# Patient Record
Sex: FEMALE | Race: ASIAN | NOT HISPANIC OR LATINO | Employment: FULL TIME | URBAN - METROPOLITAN AREA
[De-identification: names, ages, dates, MRNs, and addresses within clinical notes are randomized per-mention and may not be internally consistent; named-entity substitution may affect disease eponyms.]

---

## 2017-01-12 ENCOUNTER — COMMUNICATION - HEALTHEAST (OUTPATIENT)
Dept: FAMILY MEDICINE | Facility: CLINIC | Age: 27
End: 2017-01-12

## 2017-01-23 ENCOUNTER — PRENATAL OFFICE VISIT - HEALTHEAST (OUTPATIENT)
Dept: FAMILY MEDICINE | Facility: CLINIC | Age: 27
End: 2017-01-23

## 2017-01-23 DIAGNOSIS — Z34.92 PRENATAL CARE IN SECOND TRIMESTER: ICD-10-CM

## 2017-01-23 ASSESSMENT — MIFFLIN-ST. JEOR: SCORE: 1660.29

## 2017-02-16 ENCOUNTER — HOSPITAL ENCOUNTER (OUTPATIENT)
Dept: ULTRASOUND IMAGING | Facility: HOSPITAL | Age: 27
Discharge: HOME | End: 2017-02-16
Attending: FAMILY MEDICINE

## 2017-02-20 ENCOUNTER — PRENATAL OFFICE VISIT - HEALTHEAST (OUTPATIENT)
Dept: FAMILY MEDICINE | Facility: CLINIC | Age: 27
End: 2017-02-20

## 2017-02-20 DIAGNOSIS — E66.9 OBESITY: ICD-10-CM

## 2017-02-20 DIAGNOSIS — Z34.92 PRENATAL CARE IN SECOND TRIMESTER: ICD-10-CM

## 2017-02-20 ASSESSMENT — MIFFLIN-ST. JEOR: SCORE: 1689.78

## 2017-03-20 ENCOUNTER — PRENATAL OFFICE VISIT - HEALTHEAST (OUTPATIENT)
Dept: FAMILY MEDICINE | Facility: CLINIC | Age: 27
End: 2017-03-20

## 2017-03-20 DIAGNOSIS — E66.9 OBESITY: ICD-10-CM

## 2017-03-20 DIAGNOSIS — Z34.92 PRENATAL CARE IN SECOND TRIMESTER: ICD-10-CM

## 2017-03-20 ASSESSMENT — MIFFLIN-ST. JEOR: SCORE: 1721.53

## 2017-04-17 ENCOUNTER — PRENATAL OFFICE VISIT - HEALTHEAST (OUTPATIENT)
Dept: FAMILY MEDICINE | Facility: CLINIC | Age: 27
End: 2017-04-17

## 2017-04-17 ENCOUNTER — COMMUNICATION - HEALTHEAST (OUTPATIENT)
Dept: FAMILY MEDICINE | Facility: CLINIC | Age: 27
End: 2017-04-17

## 2017-04-17 DIAGNOSIS — Z34.92 PRENATAL CARE IN SECOND TRIMESTER: ICD-10-CM

## 2017-04-17 ASSESSMENT — MIFFLIN-ST. JEOR: SCORE: 1755.55

## 2017-04-18 ENCOUNTER — COMMUNICATION - HEALTHEAST (OUTPATIENT)
Dept: FAMILY MEDICINE | Facility: CLINIC | Age: 27
End: 2017-04-18

## 2017-04-18 LAB — SYPHILIS RPR SCREEN - HISTORICAL: NORMAL

## 2017-05-01 ENCOUNTER — PRENATAL OFFICE VISIT - HEALTHEAST (OUTPATIENT)
Dept: FAMILY MEDICINE | Facility: CLINIC | Age: 27
End: 2017-05-01

## 2017-05-01 DIAGNOSIS — Z34.90 PREGNANCY: ICD-10-CM

## 2017-05-01 DIAGNOSIS — E66.9 OBESITY: ICD-10-CM

## 2017-05-01 ASSESSMENT — MIFFLIN-ST. JEOR: SCORE: 1769.15

## 2017-05-30 ENCOUNTER — PRENATAL OFFICE VISIT - HEALTHEAST (OUTPATIENT)
Dept: FAMILY MEDICINE | Facility: CLINIC | Age: 27
End: 2017-05-30

## 2017-05-30 DIAGNOSIS — Z34.90 PREGNANCY: ICD-10-CM

## 2017-05-30 ASSESSMENT — MIFFLIN-ST. JEOR: SCORE: 1790.7

## 2017-06-06 ENCOUNTER — PRENATAL OFFICE VISIT - HEALTHEAST (OUTPATIENT)
Dept: FAMILY MEDICINE | Facility: CLINIC | Age: 27
End: 2017-06-06

## 2017-06-06 DIAGNOSIS — Z34.90 PREGNANCY: ICD-10-CM

## 2017-06-06 ASSESSMENT — MIFFLIN-ST. JEOR: SCORE: 1807.71

## 2017-06-15 ENCOUNTER — PRENATAL OFFICE VISIT - HEALTHEAST (OUTPATIENT)
Dept: FAMILY MEDICINE | Facility: CLINIC | Age: 27
End: 2017-06-15

## 2017-06-15 DIAGNOSIS — E66.9 OBESITY: ICD-10-CM

## 2017-06-15 DIAGNOSIS — Z34.90 PREGNANCY: ICD-10-CM

## 2017-06-15 ASSESSMENT — MIFFLIN-ST. JEOR: SCORE: 1791.83

## 2017-06-27 ENCOUNTER — PRENATAL OFFICE VISIT - HEALTHEAST (OUTPATIENT)
Dept: FAMILY MEDICINE | Facility: CLINIC | Age: 27
End: 2017-06-27

## 2017-06-27 ENCOUNTER — HOSPITAL ENCOUNTER (OUTPATIENT)
Dept: ULTRASOUND IMAGING | Facility: HOSPITAL | Age: 27
Discharge: HOME | End: 2017-06-27
Attending: FAMILY MEDICINE

## 2017-06-27 DIAGNOSIS — E66.9 OBESITY: ICD-10-CM

## 2017-06-27 DIAGNOSIS — Z33.1 PREGNANT STATE, INCIDENTAL: ICD-10-CM

## 2017-06-27 DIAGNOSIS — Z34.90 PREGNANCY: ICD-10-CM

## 2017-06-27 ASSESSMENT — MIFFLIN-ST. JEOR: SCORE: 1825.85

## 2017-07-06 ENCOUNTER — AMBULATORY - HEALTHEAST (OUTPATIENT)
Dept: FAMILY MEDICINE | Facility: CLINIC | Age: 27
End: 2017-07-06

## 2017-07-06 ENCOUNTER — COMMUNICATION - HEALTHEAST (OUTPATIENT)
Dept: FAMILY MEDICINE | Facility: CLINIC | Age: 27
End: 2017-07-06

## 2017-07-06 DIAGNOSIS — R10.2 POSTPARTUM PERINEAL PAIN: ICD-10-CM

## 2017-07-27 ENCOUNTER — COMMUNICATION - HEALTHEAST (OUTPATIENT)
Dept: FAMILY MEDICINE | Facility: CLINIC | Age: 27
End: 2017-07-27

## 2017-08-08 ENCOUNTER — OFFICE VISIT - HEALTHEAST (OUTPATIENT)
Dept: FAMILY MEDICINE | Facility: CLINIC | Age: 27
End: 2017-08-08

## 2017-08-08 DIAGNOSIS — D64.9 ANEMIA: ICD-10-CM

## 2017-08-08 ASSESSMENT — MIFFLIN-ST. JEOR: SCORE: 1696.58

## 2017-08-10 ENCOUNTER — COMMUNICATION - HEALTHEAST (OUTPATIENT)
Dept: FAMILY MEDICINE | Facility: CLINIC | Age: 27
End: 2017-08-10

## 2018-08-21 ENCOUNTER — OFFICE VISIT - HEALTHEAST (OUTPATIENT)
Dept: FAMILY MEDICINE | Facility: CLINIC | Age: 28
End: 2018-08-21

## 2018-08-21 DIAGNOSIS — D64.9 ANEMIA: ICD-10-CM

## 2018-08-21 DIAGNOSIS — N92.6 IRREGULAR MENSES: ICD-10-CM

## 2018-08-21 DIAGNOSIS — E66.9 OBESE: ICD-10-CM

## 2018-08-21 DIAGNOSIS — Z23 NEED FOR HPV VACCINATION: ICD-10-CM

## 2018-08-21 LAB
HCG UR QL: NEGATIVE
HGB BLD-MCNC: 13.4 G/DL (ref 12–16)
SP GR UR STRIP: 1.02 (ref 1–1.03)

## 2018-08-21 ASSESSMENT — MIFFLIN-ST. JEOR: SCORE: 1748.29

## 2018-09-14 ENCOUNTER — COMMUNICATION - HEALTHEAST (OUTPATIENT)
Dept: FAMILY MEDICINE | Facility: CLINIC | Age: 28
End: 2018-09-14

## 2018-10-12 ENCOUNTER — OFFICE VISIT - HEALTHEAST (OUTPATIENT)
Dept: FAMILY MEDICINE | Facility: CLINIC | Age: 28
End: 2018-10-12

## 2018-10-12 DIAGNOSIS — Z23 NEED FOR IMMUNIZATION AGAINST INFLUENZA: ICD-10-CM

## 2018-10-12 DIAGNOSIS — Z34.90 PREGNANCY: ICD-10-CM

## 2018-10-12 DIAGNOSIS — R63.5 WEIGHT GAIN: ICD-10-CM

## 2018-10-12 DIAGNOSIS — N92.6 IRREGULAR MENSES: ICD-10-CM

## 2018-10-12 LAB
ALBUMIN SERPL-MCNC: 4.1 G/DL (ref 3.5–5)
ALP SERPL-CCNC: 65 U/L (ref 45–120)
ALT SERPL W P-5'-P-CCNC: 59 U/L (ref 0–45)
ANION GAP SERPL CALCULATED.3IONS-SCNC: 10 MMOL/L (ref 5–18)
AST SERPL W P-5'-P-CCNC: 30 U/L (ref 0–40)
BASOPHILS # BLD AUTO: 0 THOU/UL (ref 0–0.2)
BASOPHILS NFR BLD AUTO: 1 % (ref 0–2)
BILIRUB SERPL-MCNC: 0.9 MG/DL (ref 0–1)
BUN SERPL-MCNC: 16 MG/DL (ref 8–22)
CALCIUM SERPL-MCNC: 9.6 MG/DL (ref 8.5–10.5)
CHLORIDE BLD-SCNC: 104 MMOL/L (ref 98–107)
CO2 SERPL-SCNC: 26 MMOL/L (ref 22–31)
CREAT SERPL-MCNC: 0.71 MG/DL (ref 0.6–1.1)
EOSINOPHIL # BLD AUTO: 0.2 THOU/UL (ref 0–0.4)
EOSINOPHIL NFR BLD AUTO: 3 % (ref 0–6)
ERYTHROCYTE [DISTWIDTH] IN BLOOD BY AUTOMATED COUNT: 11.6 % (ref 11–14.5)
FSH SERPL-ACNC: <3 MIU/ML
GFR SERPL CREATININE-BSD FRML MDRD: >60 ML/MIN/1.73M2
GLUCOSE BLD-MCNC: 101 MG/DL (ref 70–125)
HBA1C MFR BLD: 5.3 % (ref 3.5–6)
HCG UR QL: NEGATIVE
HCT VFR BLD AUTO: 40.2 % (ref 35–47)
HGB BLD-MCNC: 13.2 G/DL (ref 12–16)
LH SERPL-ACNC: 6.9 MIU/ML
LYMPHOCYTES # BLD AUTO: 2.4 THOU/UL (ref 0.8–4.4)
LYMPHOCYTES NFR BLD AUTO: 29 % (ref 20–40)
MCH RBC QN AUTO: 30.5 PG (ref 27–34)
MCHC RBC AUTO-ENTMCNC: 32.9 G/DL (ref 32–36)
MCV RBC AUTO: 93 FL (ref 80–100)
MONOCYTES # BLD AUTO: 0.6 THOU/UL (ref 0–0.9)
MONOCYTES NFR BLD AUTO: 7 % (ref 2–10)
NEUTROPHILS # BLD AUTO: 5 THOU/UL (ref 2–7.7)
NEUTROPHILS NFR BLD AUTO: 61 % (ref 50–70)
PLATELET # BLD AUTO: 239 THOU/UL (ref 140–440)
PMV BLD AUTO: 7.8 FL (ref 7–10)
POTASSIUM BLD-SCNC: 3.8 MMOL/L (ref 3.5–5)
PROLACTIN SERPL-MCNC: 11.7 NG/ML (ref 0–20)
PROT SERPL-MCNC: 7.7 G/DL (ref 6–8)
RBC # BLD AUTO: 4.32 MILL/UL (ref 3.8–5.4)
SODIUM SERPL-SCNC: 140 MMOL/L (ref 136–145)
SP GR UR STRIP: 1.02 (ref 1–1.03)
TSH SERPL DL<=0.005 MIU/L-ACNC: 2.51 UIU/ML (ref 0.3–5)
TSH SERPL DL<=0.005 MIU/L-ACNC: 2.72 UIU/ML (ref 0.3–5)
WBC: 8.3 THOU/UL (ref 4–11)

## 2018-10-12 ASSESSMENT — MIFFLIN-ST. JEOR: SCORE: 1746.48

## 2018-10-14 LAB — DHEA-S SERPL-MCNC: 120 UG/DL (ref 65–380)

## 2018-10-16 LAB
17OHP SERPL-MCNC: 27 NG/DL
ANDROST SERPL-MCNC: 0.49 NG/ML (ref 0.26–2.14)
SHBG SERPL-SCNC: 14 NMOL/L (ref 30–135)
TESTOST FREE SERPL-MCNC: 0.56 NG/DL (ref 0.08–0.74)
TESTOST SERPL-MCNC: 21 NG/DL (ref 8–60)

## 2018-10-17 ENCOUNTER — HOSPITAL ENCOUNTER (OUTPATIENT)
Dept: ULTRASOUND IMAGING | Facility: HOSPITAL | Age: 28
Discharge: HOME | End: 2018-10-17
Attending: FAMILY MEDICINE

## 2018-10-17 DIAGNOSIS — R63.5 WEIGHT GAIN: ICD-10-CM

## 2018-10-17 DIAGNOSIS — N92.6 IRREGULAR MENSES: ICD-10-CM

## 2018-11-03 ENCOUNTER — COMMUNICATION - HEALTHEAST (OUTPATIENT)
Dept: FAMILY MEDICINE | Facility: CLINIC | Age: 28
End: 2018-11-03

## 2018-11-08 ENCOUNTER — COMMUNICATION - HEALTHEAST (OUTPATIENT)
Dept: FAMILY MEDICINE | Facility: CLINIC | Age: 28
End: 2018-11-08

## 2018-11-08 DIAGNOSIS — N83.299 COMPLEX OVARIAN CYST: ICD-10-CM

## 2018-11-13 ENCOUNTER — COMMUNICATION - HEALTHEAST (OUTPATIENT)
Dept: FAMILY MEDICINE | Facility: CLINIC | Age: 28
End: 2018-11-13

## 2018-11-14 ENCOUNTER — AMBULATORY - HEALTHEAST (OUTPATIENT)
Dept: FAMILY MEDICINE | Facility: CLINIC | Age: 28
End: 2018-11-14

## 2018-11-14 DIAGNOSIS — N92.6 IRREGULAR MENSES: ICD-10-CM

## 2018-11-15 ENCOUNTER — COMMUNICATION - HEALTHEAST (OUTPATIENT)
Dept: ADMINISTRATIVE | Facility: CLINIC | Age: 28
End: 2018-11-15

## 2018-11-20 ENCOUNTER — COMMUNICATION - HEALTHEAST (OUTPATIENT)
Dept: FAMILY MEDICINE | Facility: CLINIC | Age: 28
End: 2018-11-20

## 2018-11-20 ENCOUNTER — HOSPITAL ENCOUNTER (OUTPATIENT)
Dept: ULTRASOUND IMAGING | Facility: HOSPITAL | Age: 28
Discharge: HOME | End: 2018-11-20
Attending: FAMILY MEDICINE

## 2018-11-20 DIAGNOSIS — N83.299 COMPLEX OVARIAN CYST: ICD-10-CM

## 2018-11-26 ENCOUNTER — COMMUNICATION - HEALTHEAST (OUTPATIENT)
Dept: FAMILY MEDICINE | Facility: CLINIC | Age: 28
End: 2018-11-26

## 2019-01-23 ENCOUNTER — COMMUNICATION - HEALTHEAST (OUTPATIENT)
Dept: HEALTH INFORMATION MANAGEMENT | Facility: CLINIC | Age: 29
End: 2019-01-23

## 2019-01-23 ENCOUNTER — COMMUNICATION - HEALTHEAST (OUTPATIENT)
Dept: TELEHEALTH | Facility: CLINIC | Age: 29
End: 2019-01-23

## 2019-02-23 ENCOUNTER — RECORDS - HEALTHEAST (OUTPATIENT)
Dept: ADMINISTRATIVE | Facility: OTHER | Age: 29
End: 2019-02-23

## 2019-02-23 ENCOUNTER — SURGERY - HEALTHEAST (OUTPATIENT)
Dept: SURGERY | Facility: HOSPITAL | Age: 29
End: 2019-02-23

## 2019-02-23 ENCOUNTER — ANESTHESIA - HEALTHEAST (OUTPATIENT)
Dept: SURGERY | Facility: HOSPITAL | Age: 29
End: 2019-02-23

## 2019-02-23 ASSESSMENT — MIFFLIN-ST. JEOR
SCORE: 1714.72
SCORE: 1746.48

## 2019-03-01 ENCOUNTER — RECORDS - HEALTHEAST (OUTPATIENT)
Dept: ADMINISTRATIVE | Facility: OTHER | Age: 29
End: 2019-03-01

## 2019-03-11 ENCOUNTER — AMBULATORY - HEALTHEAST (OUTPATIENT)
Dept: FAMILY MEDICINE | Facility: CLINIC | Age: 29
End: 2019-03-11

## 2019-03-11 DIAGNOSIS — E28.2 PCOS (POLYCYSTIC OVARIAN SYNDROME): ICD-10-CM

## 2019-03-15 ENCOUNTER — COMMUNICATION - HEALTHEAST (OUTPATIENT)
Dept: FAMILY MEDICINE | Facility: CLINIC | Age: 29
End: 2019-03-15

## 2019-04-15 ENCOUNTER — RECORDS - HEALTHEAST (OUTPATIENT)
Dept: ADMINISTRATIVE | Facility: OTHER | Age: 29
End: 2019-04-15

## 2019-04-16 ENCOUNTER — RECORDS - HEALTHEAST (OUTPATIENT)
Dept: ADMINISTRATIVE | Facility: OTHER | Age: 29
End: 2019-04-16

## 2019-04-23 ENCOUNTER — COMMUNICATION - HEALTHEAST (OUTPATIENT)
Dept: FAMILY MEDICINE | Facility: CLINIC | Age: 29
End: 2019-04-23

## 2019-04-23 ENCOUNTER — OFFICE VISIT - HEALTHEAST (OUTPATIENT)
Dept: FAMILY MEDICINE | Facility: CLINIC | Age: 29
End: 2019-04-23

## 2019-04-23 DIAGNOSIS — Z3A.01 LESS THAN 8 WEEKS GESTATION OF PREGNANCY: ICD-10-CM

## 2019-04-23 DIAGNOSIS — E66.01 MORBID OBESITY (H): ICD-10-CM

## 2019-04-23 DIAGNOSIS — N92.6 MISSED MENSES: ICD-10-CM

## 2019-04-23 DIAGNOSIS — D39.10 GRANULOSA CELL TUMOR: ICD-10-CM

## 2019-04-23 LAB — HCG UR QL: POSITIVE

## 2019-04-23 ASSESSMENT — MIFFLIN-ST. JEOR: SCORE: 1720.24

## 2019-04-26 ENCOUNTER — RECORDS - HEALTHEAST (OUTPATIENT)
Dept: ADMINISTRATIVE | Facility: OTHER | Age: 29
End: 2019-04-26

## 2019-05-06 ENCOUNTER — HOSPITAL ENCOUNTER (OUTPATIENT)
Dept: MRI IMAGING | Facility: CLINIC | Age: 29
Discharge: HOME | End: 2019-05-06
Attending: OBSTETRICS & GYNECOLOGY

## 2019-05-06 DIAGNOSIS — D39.10 GRANULOSA CELL TUMOR OF OVARY, UNSPECIFIED LATERALITY: ICD-10-CM

## 2019-05-10 ENCOUNTER — COMMUNICATION - HEALTHEAST (OUTPATIENT)
Dept: FAMILY MEDICINE | Facility: CLINIC | Age: 29
End: 2019-05-10

## 2019-05-10 ENCOUNTER — RECORDS - HEALTHEAST (OUTPATIENT)
Dept: ADMINISTRATIVE | Facility: OTHER | Age: 29
End: 2019-05-10

## 2019-05-10 DIAGNOSIS — Z3A.01 LESS THAN 8 WEEKS GESTATION OF PREGNANCY: ICD-10-CM

## 2019-05-14 ENCOUNTER — COMMUNICATION - HEALTHEAST (OUTPATIENT)
Dept: ADMINISTRATIVE | Facility: CLINIC | Age: 29
End: 2019-05-14

## 2019-05-14 ENCOUNTER — HOSPITAL ENCOUNTER (OUTPATIENT)
Dept: ULTRASOUND IMAGING | Facility: HOSPITAL | Age: 29
Discharge: HOME | End: 2019-05-14
Attending: FAMILY MEDICINE

## 2019-05-14 DIAGNOSIS — Z3A.01 LESS THAN 8 WEEKS GESTATION OF PREGNANCY: ICD-10-CM

## 2019-06-07 ENCOUNTER — COMMUNICATION - HEALTHEAST (OUTPATIENT)
Dept: FAMILY MEDICINE | Facility: CLINIC | Age: 29
End: 2019-06-07

## 2019-07-05 ENCOUNTER — PRENATAL OFFICE VISIT - HEALTHEAST (OUTPATIENT)
Dept: FAMILY MEDICINE | Facility: CLINIC | Age: 29
End: 2019-07-05

## 2019-07-05 DIAGNOSIS — O09.92 HIGH-RISK PREGNANCY IN SECOND TRIMESTER: ICD-10-CM

## 2019-07-05 DIAGNOSIS — D39.10 GRANULOSA CELL TUMOR: ICD-10-CM

## 2019-07-05 DIAGNOSIS — Z12.4 SCREENING FOR CERVICAL CANCER: ICD-10-CM

## 2019-07-05 DIAGNOSIS — E66.01 MORBID OBESITY (H): ICD-10-CM

## 2019-07-05 LAB
ALBUMIN UR-MCNC: NEGATIVE MG/DL
AMPHETAMINES UR QL SCN: NORMAL
APPEARANCE UR: CLEAR
BARBITURATES UR QL: NORMAL
BASOPHILS # BLD AUTO: 0 THOU/UL (ref 0–0.2)
BASOPHILS NFR BLD AUTO: 1 % (ref 0–2)
BENZODIAZ UR QL: NORMAL
BILIRUB UR QL STRIP: NEGATIVE
CANNABINOIDS UR QL SCN: NORMAL
CLUE CELLS: NORMAL
COCAINE UR QL: NORMAL
COLOR UR AUTO: YELLOW
CREAT UR-MCNC: 150.4 MG/DL
EOSINOPHIL # BLD AUTO: 0.1 THOU/UL (ref 0–0.4)
EOSINOPHIL NFR BLD AUTO: 1 % (ref 0–6)
ERYTHROCYTE [DISTWIDTH] IN BLOOD BY AUTOMATED COUNT: 15.3 % (ref 11–14.5)
FASTING STATUS PATIENT QL REPORTED: YES
GLUCOSE BLD-MCNC: 62 MG/DL (ref 70–99)
GLUCOSE UR STRIP-MCNC: NEGATIVE MG/DL
HBA1C MFR BLD: 5.3 % (ref 3.5–6)
HCT VFR BLD AUTO: 38 % (ref 35–47)
HGB BLD-MCNC: 12.7 G/DL (ref 12–16)
HGB UR QL STRIP: ABNORMAL
HIV 1+2 AB+HIV1 P24 AG SERPL QL IA: NEGATIVE
KETONES UR STRIP-MCNC: NEGATIVE MG/DL
LEUKOCYTE ESTERASE UR QL STRIP: ABNORMAL
LYMPHOCYTES # BLD AUTO: 1.7 THOU/UL (ref 0.8–4.4)
LYMPHOCYTES NFR BLD AUTO: 20 % (ref 20–40)
MCH RBC QN AUTO: 30.1 PG (ref 27–34)
MCHC RBC AUTO-ENTMCNC: 33.4 G/DL (ref 32–36)
MCV RBC AUTO: 90 FL (ref 80–100)
MONOCYTES # BLD AUTO: 0.5 THOU/UL (ref 0–0.9)
MONOCYTES NFR BLD AUTO: 5 % (ref 2–10)
NEUTROPHILS # BLD AUTO: 6.3 THOU/UL (ref 2–7.7)
NEUTROPHILS NFR BLD AUTO: 73 % (ref 50–70)
NITRATE UR QL: NEGATIVE
OPIATES UR QL SCN: NORMAL
OXYCODONE UR QL: NORMAL
PCP UR QL SCN: NORMAL
PH UR STRIP: 5.5 [PH] (ref 5–8)
PLATELET # BLD AUTO: 208 THOU/UL (ref 140–440)
PMV BLD AUTO: 11.2 FL (ref 8.5–12.5)
RBC # BLD AUTO: 4.22 MILL/UL (ref 3.8–5.4)
SP GR UR STRIP: 1.02 (ref 1–1.03)
TRICHOMONAS, WET PREP: NORMAL
UROBILINOGEN UR STRIP-ACNC: ABNORMAL
WBC: 8.7 THOU/UL (ref 4–11)
YEAST, WET PREP: NORMAL

## 2019-07-05 ASSESSMENT — MIFFLIN-ST. JEOR: SCORE: 1737.4

## 2019-07-06 LAB
ANTIBODY SCREEN: NEGATIVE
BACTERIA SPEC CULT: NO GROWTH
COLLECTION METHOD: NORMAL
HBV SURFACE AG SERPL QL IA: NEGATIVE
LEAD BLD-MCNC: NORMAL UG/DL
LEAD RETEST: NO

## 2019-07-07 LAB
LEAD BLDV-MCNC: <2 UG/DL (ref 0–4.9)
T PALLIDUM AB SER QL: NEGATIVE

## 2019-07-08 LAB
ABO/RH(D): NORMAL
ABORH REPEAT: NORMAL
C TRACH DNA SPEC QL PROBE+SIG AMP: NEGATIVE
N GONORRHOEA DNA SPEC QL NAA+PROBE: NEGATIVE
RUBV IGG SERPL QL IA: NORMAL

## 2019-07-09 ENCOUNTER — COMMUNICATION - HEALTHEAST (OUTPATIENT)
Dept: FAMILY MEDICINE | Facility: CLINIC | Age: 29
End: 2019-07-09

## 2019-07-09 LAB
BKR LAB AP ABNORMAL BLEEDING: NO
BKR LAB AP BIRTH CONTROL/HORMONES: NORMAL
BKR LAB AP CERVICAL APPEARANCE: NORMAL
BKR LAB AP GYN ADEQUACY: NORMAL
BKR LAB AP GYN INTERPRETATION: NORMAL
BKR LAB AP HPV REFLEX: NORMAL
BKR LAB AP LMP: NORMAL
BKR LAB AP PATIENT STATUS: NORMAL
BKR LAB AP PREVIOUS ABNORMAL: NORMAL
BKR LAB AP PREVIOUS NORMAL: 2016
HIGH RISK?: NO
PATH REPORT.COMMENTS IMP SPEC: NORMAL
RESULT FLAG (HE HISTORICAL CONVERSION): NORMAL

## 2019-07-29 ENCOUNTER — COMMUNICATION - HEALTHEAST (OUTPATIENT)
Dept: FAMILY MEDICINE | Facility: CLINIC | Age: 29
End: 2019-07-29

## 2019-08-05 ENCOUNTER — RECORDS - HEALTHEAST (OUTPATIENT)
Dept: ADMINISTRATIVE | Facility: OTHER | Age: 29
End: 2019-08-05

## 2019-08-12 ENCOUNTER — HOSPITAL ENCOUNTER (OUTPATIENT)
Dept: MRI IMAGING | Facility: HOSPITAL | Age: 29
Discharge: HOME | End: 2019-08-12
Attending: OBSTETRICS & GYNECOLOGY

## 2019-08-12 DIAGNOSIS — Z34.90 INTRAUTERINE PREGNANCY: ICD-10-CM

## 2019-08-12 DIAGNOSIS — D39.10 GRANULOSA CELL TUMOR OF OVARY, UNSPECIFIED LATERALITY: ICD-10-CM

## 2019-08-15 ENCOUNTER — RECORDS - HEALTHEAST (OUTPATIENT)
Dept: ADMINISTRATIVE | Facility: OTHER | Age: 29
End: 2019-08-15

## 2019-09-01 ENCOUNTER — COMMUNICATION - HEALTHEAST (OUTPATIENT)
Dept: FAMILY MEDICINE | Facility: CLINIC | Age: 29
End: 2019-09-01

## 2019-09-03 ENCOUNTER — AMBULATORY - HEALTHEAST (OUTPATIENT)
Dept: FAMILY MEDICINE | Facility: CLINIC | Age: 29
End: 2019-09-03

## 2019-09-03 DIAGNOSIS — J30.89 SEASONAL ALLERGIC RHINITIS DUE TO OTHER ALLERGIC TRIGGER: ICD-10-CM

## 2019-09-06 ENCOUNTER — PRENATAL OFFICE VISIT - HEALTHEAST (OUTPATIENT)
Dept: FAMILY MEDICINE | Facility: CLINIC | Age: 29
End: 2019-09-06

## 2019-09-06 DIAGNOSIS — Z3A.24 24 WEEKS GESTATION OF PREGNANCY: ICD-10-CM

## 2019-09-06 DIAGNOSIS — J30.89 SEASONAL ALLERGIC RHINITIS DUE TO OTHER ALLERGIC TRIGGER: ICD-10-CM

## 2019-09-06 DIAGNOSIS — Z23 NEED FOR IMMUNIZATION AGAINST INFLUENZA: ICD-10-CM

## 2019-09-06 LAB
ALBUMIN UR-MCNC: ABNORMAL MG/DL
APPEARANCE UR: ABNORMAL
BACTERIA #/AREA URNS HPF: ABNORMAL HPF
BILIRUB UR QL STRIP: NEGATIVE
COLOR UR AUTO: YELLOW
FASTING STATUS PATIENT QL REPORTED: NO
GLUCOSE 1H P 50 G GLC PO SERPL-MCNC: 105 MG/DL (ref 70–139)
GLUCOSE UR STRIP-MCNC: NEGATIVE MG/DL
HGB BLD-MCNC: 11.5 G/DL (ref 12–16)
HGB UR QL STRIP: NEGATIVE
KETONES UR STRIP-MCNC: NEGATIVE MG/DL
LEUKOCYTE ESTERASE UR QL STRIP: ABNORMAL
NITRATE UR QL: NEGATIVE
PH UR STRIP: 6 [PH] (ref 5–8)
RBC #/AREA URNS AUTO: ABNORMAL HPF
SP GR UR STRIP: >=1.03 (ref 1–1.03)
SQUAMOUS #/AREA URNS AUTO: ABNORMAL LPF
UROBILINOGEN UR STRIP-ACNC: ABNORMAL
WBC #/AREA URNS AUTO: ABNORMAL HPF
WBC CLUMPS #/AREA URNS HPF: PRESENT /[HPF]

## 2019-09-06 ASSESSMENT — MIFFLIN-ST. JEOR: SCORE: 1773.69

## 2019-09-07 LAB
BACTERIA SPEC CULT: NO GROWTH
T PALLIDUM AB SER QL: NEGATIVE

## 2019-09-08 ENCOUNTER — COMMUNICATION - HEALTHEAST (OUTPATIENT)
Dept: FAMILY MEDICINE | Facility: CLINIC | Age: 29
End: 2019-09-08

## 2019-09-23 ENCOUNTER — RECORDS - HEALTHEAST (OUTPATIENT)
Dept: ADMINISTRATIVE | Facility: OTHER | Age: 29
End: 2019-09-23

## 2019-10-01 ENCOUNTER — COMMUNICATION - HEALTHEAST (OUTPATIENT)
Dept: FAMILY MEDICINE | Facility: CLINIC | Age: 29
End: 2019-10-01

## 2019-10-04 ENCOUNTER — PRENATAL OFFICE VISIT - HEALTHEAST (OUTPATIENT)
Dept: FAMILY MEDICINE | Facility: CLINIC | Age: 29
End: 2019-10-04

## 2019-10-04 DIAGNOSIS — I49.9 CARDIAC ARRHYTHMIA, UNSPECIFIED CARDIAC ARRHYTHMIA TYPE: ICD-10-CM

## 2019-10-04 DIAGNOSIS — K21.9 GASTROESOPHAGEAL REFLUX DISEASE WITHOUT ESOPHAGITIS: ICD-10-CM

## 2019-10-04 DIAGNOSIS — Z3A.28 28 WEEKS GESTATION OF PREGNANCY: ICD-10-CM

## 2019-10-04 DIAGNOSIS — R25.2 CRAMP OF LIMB: ICD-10-CM

## 2019-10-04 LAB
ATRIAL RATE - MUSE: 84 BPM
DIASTOLIC BLOOD PRESSURE - MUSE: NORMAL
INTERPRETATION ECG - MUSE: NORMAL
P AXIS - MUSE: 36 DEGREES
PR INTERVAL - MUSE: 136 MS
QRS DURATION - MUSE: 84 MS
QT - MUSE: 370 MS
QTC - MUSE: 437 MS
R AXIS - MUSE: 23 DEGREES
SYSTOLIC BLOOD PRESSURE - MUSE: NORMAL
T AXIS - MUSE: 27 DEGREES
TSH SERPL DL<=0.005 MIU/L-ACNC: 1.22 UIU/ML (ref 0.3–5)
VENTRICULAR RATE- MUSE: 84 BPM

## 2019-10-04 ASSESSMENT — MIFFLIN-ST. JEOR: SCORE: 1789.57

## 2019-10-08 ENCOUNTER — COMMUNICATION - HEALTHEAST (OUTPATIENT)
Dept: FAMILY MEDICINE | Facility: CLINIC | Age: 29
End: 2019-10-08

## 2019-10-31 ENCOUNTER — PRENATAL OFFICE VISIT - HEALTHEAST (OUTPATIENT)
Dept: FAMILY MEDICINE | Facility: CLINIC | Age: 29
End: 2019-10-31

## 2019-10-31 DIAGNOSIS — O09.293 HISTORY OF POSTPARTUM HEMORRHAGE, CURRENTLY PREGNANT IN THIRD TRIMESTER: ICD-10-CM

## 2019-10-31 DIAGNOSIS — D39.10 GRANULOSA CELL TUMOR: ICD-10-CM

## 2019-10-31 DIAGNOSIS — E66.01 MORBID OBESITY (H): ICD-10-CM

## 2019-10-31 DIAGNOSIS — O09.93 HIGH-RISK PREGNANCY SUPERVISION, THIRD TRIMESTER: ICD-10-CM

## 2019-10-31 ASSESSMENT — MIFFLIN-ST. JEOR: SCORE: 1808.84

## 2019-11-14 ENCOUNTER — PRENATAL OFFICE VISIT - HEALTHEAST (OUTPATIENT)
Dept: FAMILY MEDICINE | Facility: CLINIC | Age: 29
End: 2019-11-14

## 2019-11-14 DIAGNOSIS — D39.10 GRANULOSA CELL TUMOR: ICD-10-CM

## 2019-11-14 DIAGNOSIS — O09.293 HISTORY OF POSTPARTUM HEMORRHAGE, CURRENTLY PREGNANT IN THIRD TRIMESTER: ICD-10-CM

## 2019-11-14 DIAGNOSIS — E66.01 MORBID OBESITY (H): ICD-10-CM

## 2019-11-14 DIAGNOSIS — O09.93 HIGH-RISK PREGNANCY SUPERVISION, THIRD TRIMESTER: ICD-10-CM

## 2019-11-14 ASSESSMENT — MIFFLIN-ST. JEOR: SCORE: 1826.99

## 2019-11-27 ENCOUNTER — COMMUNICATION - HEALTHEAST (OUTPATIENT)
Dept: FAMILY MEDICINE | Facility: CLINIC | Age: 29
End: 2019-11-27

## 2019-11-29 ENCOUNTER — PRENATAL OFFICE VISIT - HEALTHEAST (OUTPATIENT)
Dept: FAMILY MEDICINE | Facility: CLINIC | Age: 29
End: 2019-11-29

## 2019-11-29 DIAGNOSIS — O09.93 HIGH-RISK PREGNANCY SUPERVISION, THIRD TRIMESTER: ICD-10-CM

## 2019-11-29 ASSESSMENT — MIFFLIN-ST. JEOR: SCORE: 1828.12

## 2019-11-30 LAB
ALLERGIC TO PENICILLIN: NO
GP B STREP DNA SPEC QL NAA+PROBE: NEGATIVE

## 2019-12-02 ENCOUNTER — HOSPITAL ENCOUNTER (OUTPATIENT)
Dept: MRI IMAGING | Facility: HOSPITAL | Age: 29
Discharge: HOME | End: 2019-12-02
Attending: OBSTETRICS & GYNECOLOGY

## 2019-12-02 DIAGNOSIS — C56.1 MALIGNANT NEOPLASM OF RIGHT OVARY (H): ICD-10-CM

## 2019-12-05 ENCOUNTER — COMMUNICATION - HEALTHEAST (OUTPATIENT)
Dept: FAMILY MEDICINE | Facility: CLINIC | Age: 29
End: 2019-12-05

## 2019-12-06 ENCOUNTER — PRENATAL OFFICE VISIT - HEALTHEAST (OUTPATIENT)
Dept: FAMILY MEDICINE | Facility: CLINIC | Age: 29
End: 2019-12-06

## 2019-12-06 DIAGNOSIS — O09.93 HIGH-RISK PREGNANCY SUPERVISION, THIRD TRIMESTER: ICD-10-CM

## 2019-12-06 DIAGNOSIS — E66.01 MORBID OBESITY (H): ICD-10-CM

## 2019-12-06 DIAGNOSIS — D39.10 GRANULOSA CELL TUMOR: ICD-10-CM

## 2019-12-06 ASSESSMENT — MIFFLIN-ST. JEOR: SCORE: 1837.19

## 2019-12-13 ENCOUNTER — PRENATAL OFFICE VISIT - HEALTHEAST (OUTPATIENT)
Dept: FAMILY MEDICINE | Facility: CLINIC | Age: 29
End: 2019-12-13

## 2019-12-13 DIAGNOSIS — O09.93 HIGH-RISK PREGNANCY SUPERVISION, THIRD TRIMESTER: ICD-10-CM

## 2019-12-13 ASSESSMENT — MIFFLIN-ST. JEOR: SCORE: 1855.34

## 2020-01-30 ENCOUNTER — OFFICE VISIT - HEALTHEAST (OUTPATIENT)
Dept: FAMILY MEDICINE | Facility: CLINIC | Age: 30
End: 2020-01-30

## 2020-01-30 DIAGNOSIS — O09.93 HIGH-RISK PREGNANCY SUPERVISION, THIRD TRIMESTER: ICD-10-CM

## 2020-01-30 DIAGNOSIS — D39.10 GRANULOSA CELL TUMOR: ICD-10-CM

## 2020-01-30 DIAGNOSIS — E66.01 MORBID OBESITY (H): ICD-10-CM

## 2020-01-30 DIAGNOSIS — K64.4 EXTERNAL HEMORRHOIDS: ICD-10-CM

## 2020-01-30 ASSESSMENT — MIFFLIN-ST. JEOR: SCORE: 1742.6

## 2020-02-21 ASSESSMENT — MIFFLIN-ST. JEOR: SCORE: 1720.39

## 2020-02-25 ENCOUNTER — ANESTHESIA - HEALTHEAST (OUTPATIENT)
Dept: SURGERY | Facility: HOSPITAL | Age: 30
End: 2020-02-25

## 2020-02-25 ENCOUNTER — SURGERY - HEALTHEAST (OUTPATIENT)
Dept: SURGERY | Facility: HOSPITAL | Age: 30
End: 2020-02-25

## 2020-03-18 ENCOUNTER — COMMUNICATION - HEALTHEAST (OUTPATIENT)
Dept: FAMILY MEDICINE | Facility: CLINIC | Age: 30
End: 2020-03-18

## 2020-03-22 ENCOUNTER — COMMUNICATION - HEALTHEAST (OUTPATIENT)
Dept: FAMILY MEDICINE | Facility: CLINIC | Age: 30
End: 2020-03-22

## 2020-03-23 ENCOUNTER — AMBULATORY - HEALTHEAST (OUTPATIENT)
Dept: FAMILY MEDICINE | Facility: CLINIC | Age: 30
End: 2020-03-23

## 2020-03-23 DIAGNOSIS — R21 RASH: ICD-10-CM

## 2020-03-23 RX ORDER — KETOCONAZOLE 20 MG/G
CREAM TOPICAL
Qty: 15 G | Refills: 0 | Status: SHIPPED | OUTPATIENT
Start: 2020-03-23 | End: 2021-08-02

## 2020-04-02 ENCOUNTER — RECORDS - HEALTHEAST (OUTPATIENT)
Dept: ADMINISTRATIVE | Facility: OTHER | Age: 30
End: 2020-04-02

## 2020-04-21 ENCOUNTER — RECORDS - HEALTHEAST (OUTPATIENT)
Dept: ADMINISTRATIVE | Facility: OTHER | Age: 30
End: 2020-04-21

## 2020-04-24 ENCOUNTER — HOSPITAL ENCOUNTER (OUTPATIENT)
Dept: INTERVENTIONAL RADIOLOGY/VASCULAR | Facility: HOSPITAL | Age: 30
Discharge: HOME | End: 2020-04-24
Attending: OBSTETRICS & GYNECOLOGY

## 2020-05-12 ENCOUNTER — HOSPITAL ENCOUNTER (OUTPATIENT)
Dept: INTERVENTIONAL RADIOLOGY/VASCULAR | Facility: HOSPITAL | Age: 30
Discharge: HOME | End: 2020-05-12
Attending: OBSTETRICS & GYNECOLOGY

## 2020-05-21 ENCOUNTER — RECORDS - HEALTHEAST (OUTPATIENT)
Dept: ADMINISTRATIVE | Facility: OTHER | Age: 30
End: 2020-05-21

## 2020-05-22 ENCOUNTER — AMBULATORY - HEALTHEAST (OUTPATIENT)
Dept: INTERVENTIONAL RADIOLOGY/VASCULAR | Facility: HOSPITAL | Age: 30
End: 2020-05-22

## 2020-05-22 DIAGNOSIS — Z11.59 ENCOUNTER FOR SCREENING FOR OTHER VIRAL DISEASES: ICD-10-CM

## 2020-05-23 ENCOUNTER — AMBULATORY - HEALTHEAST (OUTPATIENT)
Dept: FAMILY MEDICINE | Facility: CLINIC | Age: 30
End: 2020-05-23

## 2020-05-23 DIAGNOSIS — Z11.59 ENCOUNTER FOR SCREENING FOR OTHER VIRAL DISEASES: ICD-10-CM

## 2020-05-24 LAB
SARS-COV-2 PCR COMMENT: NORMAL
SARS-COV-2 RNA SPEC QL NAA+PROBE: NEGATIVE
SARS-COV-2 VIRUS SPECIMEN SOURCE: NORMAL

## 2020-05-26 ENCOUNTER — HOSPITAL ENCOUNTER (OUTPATIENT)
Dept: INTERVENTIONAL RADIOLOGY/VASCULAR | Facility: HOSPITAL | Age: 30
Discharge: HOME | End: 2020-05-26
Attending: OBSTETRICS & GYNECOLOGY | Admitting: RADIOLOGY

## 2020-05-26 DIAGNOSIS — C56.9 MALIGNANT NEOPLASM OF OVARY (H): ICD-10-CM

## 2020-05-26 LAB — HCG UR QL: NEGATIVE

## 2020-05-26 ASSESSMENT — MIFFLIN-ST. JEOR: SCORE: 1746.48

## 2020-07-30 ENCOUNTER — RECORDS - HEALTHEAST (OUTPATIENT)
Dept: ADMINISTRATIVE | Facility: OTHER | Age: 30
End: 2020-07-30

## 2020-08-06 ENCOUNTER — RECORDS - HEALTHEAST (OUTPATIENT)
Dept: ADMINISTRATIVE | Facility: OTHER | Age: 30
End: 2020-08-06

## 2020-08-14 ENCOUNTER — RECORDS - HEALTHEAST (OUTPATIENT)
Dept: ADMINISTRATIVE | Facility: OTHER | Age: 30
End: 2020-08-14

## 2020-09-10 ENCOUNTER — HOSPITAL ENCOUNTER (OUTPATIENT)
Dept: CARDIOLOGY | Facility: HOSPITAL | Age: 30
Discharge: HOME | End: 2020-09-10

## 2020-09-10 DIAGNOSIS — I51.7 ENLARGED HEART: ICD-10-CM

## 2020-09-10 LAB
AORTIC ROOT: 2.6 CM
AORTIC VALVE MEAN VELOCITY: 90.8 CM/S
ASCENDING AORTA: 2.7 CM
AV DIMENSIONLESS INDEX VTI: 0.7
AV MEAN GRADIENT: 4 MMHG
AV PEAK GRADIENT: 5.8 MMHG
AV VALVE AREA: 2.3 CM2
BSA FOR ECHO PROCEDURE: 2.16 M2
CV BLOOD PRESSURE: ABNORMAL MMHG
CV ECHO HEIGHT: 63 IN
CV ECHO WEIGHT: 232 LBS
DOP CALC AO PEAK VEL: 120 CM/S
DOP CALC AO VTI: 24.7 CM
DOP CALC LVOT AREA: 3.14 CM2
DOP CALC LVOT DIAMETER: 2 CM
DOP CALC LVOT STROKE VOLUME: 55.6 CM3
DOP CALC MV VTI: 24.7 CM
DOP CALCLVOT PEAK VEL VTI: 17.7 CM
EJECTION FRACTION: 65 % (ref 55–75)
FRACTIONAL SHORTENING: 36.4 % (ref 28–44)
INTERVENTRICULAR SEPTUM IN END DIASTOLE: 1 CM (ref 0.6–0.9)
IVS/PW RATIO: 1
LA AREA 1: 16.4 CM2
LA AREA 2: 14.1 CM2
LEFT ATRIUM AREA: 16.4 CM2
LEFT ATRIUM LENGTH: 5 CM
LEFT ATRIUM SIZE: 3.3 CM
LEFT ATRIUM TO AORTIC ROOT RATIO: 1.27 NO UNITS
LEFT ATRIUM VOLUME INDEX: 18.2 ML/M2
LEFT ATRIUM VOLUME: 39.3 ML
LEFT VENTRICLE CARDIAC INDEX: 1.9 L/MIN/M2
LEFT VENTRICLE CARDIAC OUTPUT: 4 L/MIN
LEFT VENTRICLE DIASTOLIC VOLUME INDEX: 43.5 CM3/M2 (ref 29–61)
LEFT VENTRICLE DIASTOLIC VOLUME: 94 CM3 (ref 46–106)
LEFT VENTRICLE HEART RATE: 72 BPM
LEFT VENTRICLE MASS INDEX: 98.7 G/M2
LEFT VENTRICLE SYSTOLIC VOLUME INDEX: 15.3 CM3/M2 (ref 8–24)
LEFT VENTRICLE SYSTOLIC VOLUME: 33 CM3 (ref 14–42)
LEFT VENTRICULAR INTERNAL DIMENSION IN DIASTOLE: 5.5 CM (ref 3.8–5.2)
LEFT VENTRICULAR INTERNAL DIMENSION IN SYSTOLE: 3.5 CM (ref 2.2–3.5)
LEFT VENTRICULAR MASS: 213.2 G
LEFT VENTRICULAR OUTFLOW TRACT MEAN GRADIENT: 2 MMHG
LEFT VENTRICULAR OUTFLOW TRACT MEAN VELOCITY: 65.5 CM/S
LEFT VENTRICULAR POSTERIOR WALL IN END DIASTOLE: 1 CM (ref 0.6–0.9)
LV STROKE VOLUME INDEX: 25.7 ML/M2
MITRAL VALVE E/A RATIO: 1.3
MITRAL VALVE MEAN INFLOW VELOCITY: 47.6 CM/S
MITRAL VALVE PEAK VELOCITY: 76.8 CM/S
MV AREA VTI: 2.25 CM2
MV AVERAGE E/E' RATIO: 8.2 CM/S
MV DECELERATION TIME: 176 MS
MV E'TISSUE VEL-LAT: 10.8 CM/S
MV E'TISSUE VEL-MED: 8.97 CM/S
MV LATERAL E/E' RATIO: 7.5
MV MEAN GRADIENT: 1 MMHG
MV MEDIAL E/E' RATIO: 9.1
MV PEAK A VELOCITY: 63.2 CM/S
MV PEAK E VELOCITY: 81.4 CM/S
MV PEAK GRADIENT: 2.4 MMHG
MV VALVE AREA BY CONTINUITY EQUATION: 2.3 CM2
NUC REST DIASTOLIC VOLUME INDEX: 3712 LBS
NUC REST SYSTOLIC VOLUME INDEX: 63 IN
PR MAX PG: 3 MMHG
PR PEAK VELOCITY: 88.2 CM/S
TRICUSPID REGURGITATION PEAK PRESSURE GRADIENT: 18.1 MMHG
TRICUSPID VALVE ANULAR PLANE SYSTOLIC EXCURSION: 2.4 CM
TRICUSPID VALVE PEAK REGURGITANT VELOCITY: 213 CM/S

## 2020-09-10 ASSESSMENT — MIFFLIN-ST. JEOR: SCORE: 1746.48

## 2020-11-04 ENCOUNTER — RECORDS - HEALTHEAST (OUTPATIENT)
Dept: ADMINISTRATIVE | Facility: OTHER | Age: 30
End: 2020-11-04

## 2020-11-12 ENCOUNTER — RECORDS - HEALTHEAST (OUTPATIENT)
Dept: ADMINISTRATIVE | Facility: OTHER | Age: 30
End: 2020-11-12

## 2020-12-01 ENCOUNTER — RECORDS - HEALTHEAST (OUTPATIENT)
Dept: ADMINISTRATIVE | Facility: OTHER | Age: 30
End: 2020-12-01

## 2020-12-14 ENCOUNTER — HOSPITAL ENCOUNTER (OUTPATIENT)
Dept: MRI IMAGING | Facility: HOSPITAL | Age: 30
Discharge: HOME | End: 2020-12-14
Attending: OBSTETRICS & GYNECOLOGY

## 2020-12-14 DIAGNOSIS — C56.1: ICD-10-CM

## 2020-12-14 DIAGNOSIS — R19.00 PELVIC MASS: ICD-10-CM

## 2020-12-21 ENCOUNTER — RECORDS - HEALTHEAST (OUTPATIENT)
Dept: ADMINISTRATIVE | Facility: OTHER | Age: 30
End: 2020-12-21

## 2021-05-04 ENCOUNTER — RECORDS - HEALTHEAST (OUTPATIENT)
Dept: ADMINISTRATIVE | Facility: OTHER | Age: 31
End: 2021-05-04

## 2021-05-28 NOTE — TELEPHONE ENCOUNTER
Called and spoke with patient.  Informed patient of Dr. Fregoso's plan of care for 5mg Folic Acid qd

## 2021-05-28 NOTE — PROGRESS NOTES
"S:  27 yo female who is here for a pregnancy confirmation.  Her lmp was 3/23/19.  She feels some cramping, like her period might come.  No bleeding.  She did take a pregnancy test this am.  She was only taking metformin.  She was not taking her ocp.  She denies any sx of pregnancy . No bleeding.  No significant pain.  No n/v.  No breast tenderness.  She has been wanting another pregnancy.  Prior to getting pregnant she did have very irregular menses.    She is taking her pnv.  No etoh use no drug use.  No xrays or illness since her last period.    She did have a complex ovarian cyst noted last fall, and was followed up with a repeat US where the complex cyst was noted to be a hemorrhagic corpus luteum, andthis was read a normal ultrasound.    She then presented to the ER with abdominal pain on 11/8/19, and was found to have a torsed ovary with ruptured cyst.    This was removed as she had significant bleeding.    The pathology demonstrated a granulosa cell tumor.      She did meet with Dr. Elizabeth, whose note we requested today.  She does have an appt with gyn/onc on Friday with Dr. Adrianne Wynn in [place].  She does have a Ct scan scheduled for tomorrow, and is wondering if she should go to this.        O:  /74 (Patient Site: Right Arm, Patient Position: Sitting, Cuff Size: Adult Large)   Pulse 72   Temp 98.6  F (37  C) (Oral)   Resp 12   Ht 5' 2.21\" (1.58 m)   Wt (!) 229 lb (103.9 kg)   SpO2 99%   BMI 41.61 kg/m    Gen: cries, and is very distressed at this news, and the cancer diagnosis.            Patient Active Problem List   Diagnosis     Obese     Pregnant     Vaginal delivery     Postpartum hemorrhage of vagina     Anemia     Complex ovarian cyst     S/P laparoscopy     Current Outpatient Medications on File Prior to Visit   Medication Sig Dispense Refill     prenatal vit-iron fum-folic ac (PRENATAL VITAMIN) 27 mg iron- 0.8 mg Tab tablet Take 1 tablet by mouth daily. 90 tablet 12     " drospirenone-ethinyl estradiol (STACI, 28,) 3-0.03 mg per tablet Take 1 tablet by mouth daily. 3 Package 11     metFORMIN (GLUCOPHAGE) 500 MG tablet Take 1 tablet (500 mg total) by mouth 2 (two) times a day with meals. 180 tablet 3     No current facility-administered medications on file prior to visit.           No results found for this or any previous visit (from the past 48 hour(s)).      Assessment/Plan:  1. Missed menses    - Pregnancy, Urine    2. Granulosa cell tumor  The oncologist was consulted and she recommended not going to the CT scan tomorrow.  She will discuss the risks and benefits and options with the patient on Friday.  The patient was made aware of this.  We will cancel her visit with radiology tomorrow.    3.  Morbid obesity  Place on 5mg folic acid daily .   Monitor for diabetes    4.  Pregnancy  Unsure dates  Will obtain US in 2 weeks depending on the situation .   Continue with pnv  Start 5mg daily of folic acid.        Vanessa Fregoso   4/23/2019 3:18 PM

## 2021-05-28 NOTE — TELEPHONE ENCOUNTER
New Appointment Needed  What is the reason for the visit:    Pregnancy Confirmation Appt Needed  When was the first day of your last menstrual cycle?: March 223  Have you done a home pregnancy test?: Yes: came out positive.    Provider Preference: PCP only  How soon do you need to be seen?: would like to be seen today- as she has a CAT scan tomorrow and is not sure if she should go to that  Waitlist offered?: No  Okay to leave a detailed message:  Yes

## 2021-05-28 NOTE — TELEPHONE ENCOUNTER
Called pt and relayed pt scheduled for pregnancy confirmation with PCP at 3 pm today.      When pt calls back, please confirm today's apt. Thanks.

## 2021-05-30 VITALS — HEIGHT: 63 IN | BODY MASS INDEX: 40.13 KG/M2 | WEIGHT: 226.5 LBS

## 2021-05-30 VITALS — HEIGHT: 63 IN | WEIGHT: 213 LBS | BODY MASS INDEX: 37.74 KG/M2

## 2021-05-30 VITALS — HEIGHT: 63 IN | BODY MASS INDEX: 41.46 KG/M2 | WEIGHT: 234 LBS

## 2021-05-30 VITALS — HEIGHT: 63 IN | WEIGHT: 237 LBS | BODY MASS INDEX: 41.99 KG/M2

## 2021-05-30 VITALS — BODY MASS INDEX: 38.89 KG/M2 | HEIGHT: 63 IN | WEIGHT: 219.5 LBS

## 2021-05-30 NOTE — TELEPHONE ENCOUNTER
New Appointment Needed  What is the reason for the visit:    Patient wants to see if there is an earlier appointment sh can get into the same day as already scheduled with Dr. Fregoso 11am- and no later than 1:15   Provider Preference: [facility]  How soon do you need to be seen?:See above   Waitlist offered?: N/A  Okay to leave a detailed message:  Yes

## 2021-05-30 NOTE — PATIENT INSTRUCTIONS - HE
"  Patient Education   HEALTHY PREGNANCY CARE: 14 to 18 WEEKS PREGNANT    During this time, you may start to \"show,\" so that you look pregnant to people around you. You may also notice some changes in your skin, such as an increase in acne on your face. You may notice your heart pounding, a sharp stretching ache on either side of your lower abdomen (round ligament), and more vaginal discharge.     Your baby's nerves and muscles are maturing. Sex organs are recognizable. Your baby is now able to pass urine, and your baby's first stool (meconium) is starting to collect in his or her intestines. Hair is also beginning to grow on your baby's head. Your baby is moving freely inside your uterus but you may not be able to feel it until 18-20 weeks.    Continue making healthy choices for your baby during pregnancy, including good nutrition, exercise and a safe environment free from smoking, alcohol and drugs.    Your genetic screening with a quad screen blood test may have been done today.    Watch for warning signs and contact your midwife or physician at the clinic with any concerns at [facility] MEDICINE/OB  at Phone: [phone]. For example, call about cramping, bleeding, abdominal pain, watery vaginal discharge, or if you are unable to keep fluids down for more than 24 hours due to vomiting.   If it's after clinic hours, physician patients should call the [facility] at [phone] ([phone]); midwife patients should call their answering service at [phone].    How can you care for yourself at home?   You can refer to the Starting Out Right book or find it online at [email or web address] or [email or web address]#p=[email or web address]     You can sign up for a weekly parenting e-mail that gives support, tips and advice from health care professionals that starts with " pregnancy and continues through the toddler years. To register, go to [email or web address] at any time during your pregnancy.

## 2021-05-31 VITALS — HEIGHT: 63 IN | WEIGHT: 241.75 LBS | BODY MASS INDEX: 42.84 KG/M2

## 2021-05-31 VITALS — BODY MASS INDEX: 42.88 KG/M2 | HEIGHT: 63 IN | WEIGHT: 242 LBS

## 2021-05-31 VITALS — HEIGHT: 63 IN | BODY MASS INDEX: 44.21 KG/M2 | WEIGHT: 249.5 LBS

## 2021-05-31 VITALS — BODY MASS INDEX: 39.16 KG/M2 | WEIGHT: 221 LBS | HEIGHT: 63 IN

## 2021-05-31 VITALS — BODY MASS INDEX: 43.5 KG/M2 | WEIGHT: 245.5 LBS | HEIGHT: 63 IN

## 2021-06-01 VITALS — WEIGHT: 232.4 LBS | HEIGHT: 63 IN | BODY MASS INDEX: 41.18 KG/M2

## 2021-06-02 VITALS — HEIGHT: 63 IN | WEIGHT: 232 LBS | BODY MASS INDEX: 41.11 KG/M2

## 2021-06-02 VITALS — WEIGHT: 229 LBS | BODY MASS INDEX: 42.14 KG/M2 | HEIGHT: 62 IN

## 2021-06-02 VITALS — BODY MASS INDEX: 41.11 KG/M2 | WEIGHT: 232 LBS | HEIGHT: 63 IN

## 2021-06-02 NOTE — PROGRESS NOTES
"  S:  See Pratik is a 29 y.o. female who comes to the clinic today for  1.  Pregnancy:  She was having heartburn earlier this week.  It was only on 1 day.  She ate jalapenos before this started.  She also had pain in her bilateral arms.  The pain was throughout her entire arm, but was mostl yconcentrated in her lower arms.  It did not stop her from what she was doing.  It was an achy pain.  No dyspnea.  It improved with massage.  No diaphoresis.  It has not returned since.  She needs to sleep with more pillows due to her allergies.  This has been going on for the past 2-3 weeks.  She did not try any medicine for her heartburn.  No swelling.  She had pain in her arms like this with her first pregnancy.  Moving her arms did not alleviate or improve the pain.    No bruising.    I reviewed the pertinent family, social, surgical, medical history.    She does intermittently feel like her heart is skipping a beat.  When this happens it will last for 10 to 15 seconds and then it resolves.  It only happens once every couple of months.    O:  BP 96/62   Pulse 91   Temp 98  F (36.7  C) (Oral)   Resp 22   Ht 5' 3\" (1.6 m)   Wt (!) 241 lb 8 oz (109.5 kg)   LMP 04/10/2019 (Approximate)   SpO2 97%   BMI 42.78 kg/m    Gen: no acute distress  Neck:  Supple, no lad, no carotid bruits  Heart:  Regular rate and rhythm.  No m/r/g  Lungs: cta bilaterally, no wheezes or rhonchi.  Good air inspiration  Abdomen:  No masses or organomegaly  Extremities:  No edema.   Full range of motion bilateral wrist.  Wrist and forearms are nontender.    EKG demonstrates normal sinus rhythm        Patient Active Problem List   Diagnosis     Obese     Pregnant     Vaginal delivery     Postpartum hemorrhage of vagina     Anemia     Complex ovarian cyst     S/P laparoscopy     Granulosa cell tumor     Current Outpatient Medications on File Prior to Visit   Medication Sig Dispense Refill     drospirenone-ethinyl estradiol (STACI, 28,) 3-0.03 mg per " tablet Take 1 tablet by mouth daily. 3 Package 11     fluticasone propionate (FLONASE) 50 mcg/actuation nasal spray 1 spray into each nostril 2 times a day at 6:00 am and 4:00 pm. 18 g 2     folic acid (FOLVITE) 1 MG tablet Take 5 tablets (5 mg total) by mouth daily. 450 tablet 12     loratadine (CLARITIN) 10 mg tablet Take 1 tablet (10 mg total) by mouth daily. 30 tablet 11     prenatal vit-iron fum-folic ac (PRENATAL VITAMIN) 27 mg iron- 0.8 mg Tab tablet Take 1 tablet by mouth daily. 90 tablet 12     sodium chloride 0.65 % Drop Use 1 spray in each nostril 5 times daily 50 mL 12     No current facility-administered medications on file prior to visit.           Recent Results (from the past 48 hour(s))   Electrocardiogram Perform and Read    Collection Time: 10/04/19 12:06 PM   Result Value Ref Range    SYSTOLIC BLOOD PRESSURE      DIASTOLIC BLOOD PRESSURE      VENTRICULAR RATE 84 BPM    ATRIAL RATE 84 BPM    P-R INTERVAL 136 ms    QRS DURATION 84 ms    Q-T INTERVAL 370 ms    QTC CALCULATION (BEZET) 437 ms    P Axis 36 degrees    R AXIS 23 degrees    T AXIS 27 degrees    MUSE DIAGNOSIS       Sinus rhythm  Normal ECG  When compared with ECG of 2019 08:48,  T wave inversion no longer evident in Anterior leads  Confirmed by LIZETH CHUA MD LOC:[facility] ([id number]) on 10/4/2019 3:51:34 PM     Thyroid Cascade    Collection Time: 10/04/19 12:14 PM   Result Value Ref Range    TSH 1.22 0.30 - 5.00 uIU/mL        No images are attached to the encounter or orders placed in the encounter.       Assessment/Plan:  1. Cramp of limb  Start calcium and vitamin D.  Return if worsening.  - calcium-vitamin D (CALCIUM-VITAMIN D) 500 mg(1,250mg) -200 unit per tablet; Take 1 tablet by mouth daily.  Dispense: 100 tablet; Refill: 2    2. 28 weeks gestation of pregnancy  Reviewed signs and symptoms of  labor.  When to proceed to the hospital.  Reviewed most recent testing.  - Tdap vaccine,  6yo or older,  IM    3. Cardiac arrhythmia,  unspecified cardiac arrhythmia type  We reviewed using a 30-day event monitor if her symptoms become more frequent.  Check a thyroid cascade.  Reviewed signs and symptoms for which she should proceed to the hospital.  - Electrocardiogram Perform and Read  - Thyroid Cascade    4. Gastroesophageal reflux disease without esophagitis  Use Tums as needed for heartburn.  - calcium, as carbonate, (TUMS) 200 mg calcium (500 mg) chewable tablet; Chew 1 tablet (200 mg total) 3 (three) times a day.  Dispense: 90 tablet; Refill: 3          Vanessa Fregoso   10/4/2019 11:44 AM

## 2021-06-02 NOTE — PROGRESS NOTES
Doing well.  No problems.    Heartburn is better.    Baby is moving well.  No bleeding, cramping or lof.  No painful ctx.    Vertex on US at bedside.    Reviewed to be ready for baby.  Reviewed carseats.    She feels most of hte kicks toward the bottom of her abdomen.    She is not eating healthy . She is working on eating more veggies.    Reviewed baby cares including skin to skin, delayed cord clamping, encouraged breastfeeding, vitamin k, hepatitis b vaccine, eye ointment, 24 hour testing.  Reviewed history of PPH.  Discussed use of TXA at 8cm . This was okayed with dr. munoz today.     Reviewed postpartum dvt prophylaxis.

## 2021-06-03 VITALS
DIASTOLIC BLOOD PRESSURE: 62 MMHG | OXYGEN SATURATION: 97 % | WEIGHT: 241.5 LBS | RESPIRATION RATE: 22 BRPM | TEMPERATURE: 98 F | HEIGHT: 63 IN | BODY MASS INDEX: 42.79 KG/M2 | HEART RATE: 91 BPM | SYSTOLIC BLOOD PRESSURE: 96 MMHG

## 2021-06-03 VITALS
WEIGHT: 249.75 LBS | HEIGHT: 63 IN | TEMPERATURE: 98.3 F | HEART RATE: 87 BPM | BODY MASS INDEX: 44.25 KG/M2 | OXYGEN SATURATION: 97 % | SYSTOLIC BLOOD PRESSURE: 106 MMHG | DIASTOLIC BLOOD PRESSURE: 64 MMHG

## 2021-06-03 VITALS
WEIGHT: 238 LBS | RESPIRATION RATE: 16 BRPM | HEIGHT: 63 IN | TEMPERATURE: 98.4 F | OXYGEN SATURATION: 98 % | HEART RATE: 86 BPM | DIASTOLIC BLOOD PRESSURE: 66 MMHG | SYSTOLIC BLOOD PRESSURE: 102 MMHG | BODY MASS INDEX: 42.17 KG/M2

## 2021-06-03 VITALS
RESPIRATION RATE: 22 BRPM | HEART RATE: 87 BPM | HEIGHT: 63 IN | DIASTOLIC BLOOD PRESSURE: 66 MMHG | SYSTOLIC BLOOD PRESSURE: 96 MMHG | OXYGEN SATURATION: 98 % | BODY MASS INDEX: 43.54 KG/M2 | TEMPERATURE: 98.4 F | WEIGHT: 245.75 LBS

## 2021-06-03 VITALS — BODY MASS INDEX: 40.75 KG/M2 | HEIGHT: 63 IN | WEIGHT: 230 LBS

## 2021-06-03 NOTE — TELEPHONE ENCOUNTER
Name of form/paperwork: Leave of absence from work   Have you been seen for this request: yes - patient is being see for OB care by clinic  Do we have the form: patient states the from was being faxed by [employer] who manage her work's short/long term disability and leaves  When is form needed by: as soon as able - patient states she made the claim about 2 weeks ago.  Patient would like call back on status of the paperwork   How would you like the form returned: fax back to [employer]  Fax Number:   Patient Notified form requests are processed in 3-5 business days: Yes  (If patient needs form sooner, please note that in this message.)  Okay to leave a detailed message? Yes

## 2021-06-03 NOTE — PROGRESS NOTES
.Reviewed when to go to the hospital.    Reviewed TXA.  Reviewed, as well as DVT prophylaxis.    No headaches or vision changes.  No lower extremity edema.    Check bedside US at next visit for position.    Her  wants to do a .  His reasoning behind this is that she does need a biopsy after she delivers the baby.  Should her understanding was that the timeline was within 6 weeks.  I did review her most recent consult that indicated that there would be a robotic assisted laparoscopic biopsy of the omentum as well as other parts of the abdomen, I did review this with the patient.  We talked about how a laparoscopic assisted biopsy is a very different surgery compared to a  at the recovery time is very different.  We also talked about how she does need a  we will be calling Dr. Santoro to come in and she can likely do those biopsies at that time.  After hearing this information,She is thinking she would like to wait until postpartum and do the smaller surgery and biopsy.  She will discuss this with her partner.  I have also asked her to think about what type of birth control she would like following this delivery.  I have asked her to talk to the oncologist about what they would recommend as far as future pregnancies and how long they would like to wait to have a pregnancy in the future.  She is thinking she would like to have 4 children in total.  GBS and bedside ultrasound at next visit.

## 2021-06-03 NOTE — TELEPHONE ENCOUNTER
Please forward form(s) to CMT once received. [facility] will prepare for MD review, completion, and signature. Thank you.

## 2021-06-04 VITALS
HEART RATE: 84 BPM | DIASTOLIC BLOOD PRESSURE: 64 MMHG | OXYGEN SATURATION: 97 % | WEIGHT: 252 LBS | BODY MASS INDEX: 44.65 KG/M2 | SYSTOLIC BLOOD PRESSURE: 96 MMHG | TEMPERATURE: 98.2 F | HEIGHT: 63 IN

## 2021-06-04 VITALS
HEART RATE: 73 BPM | TEMPERATURE: 98.3 F | OXYGEN SATURATION: 97 % | HEIGHT: 63 IN | WEIGHT: 232 LBS | DIASTOLIC BLOOD PRESSURE: 78 MMHG | BODY MASS INDEX: 41.11 KG/M2 | SYSTOLIC BLOOD PRESSURE: 122 MMHG

## 2021-06-04 VITALS
SYSTOLIC BLOOD PRESSURE: 112 MMHG | HEART RATE: 78 BPM | OXYGEN SATURATION: 98 % | RESPIRATION RATE: 22 BRPM | BODY MASS INDEX: 44.3 KG/M2 | TEMPERATURE: 98.5 F | WEIGHT: 250 LBS | DIASTOLIC BLOOD PRESSURE: 66 MMHG | HEIGHT: 63 IN

## 2021-06-04 VITALS
BODY MASS INDEX: 45.36 KG/M2 | RESPIRATION RATE: 24 BRPM | SYSTOLIC BLOOD PRESSURE: 100 MMHG | WEIGHT: 256 LBS | TEMPERATURE: 98.5 F | HEIGHT: 63 IN | DIASTOLIC BLOOD PRESSURE: 70 MMHG | OXYGEN SATURATION: 98 % | HEART RATE: 82 BPM

## 2021-06-04 VITALS — BODY MASS INDEX: 40.4 KG/M2 | HEIGHT: 63 IN | WEIGHT: 228 LBS

## 2021-06-04 VITALS — BODY MASS INDEX: 41.11 KG/M2 | WEIGHT: 232 LBS | HEIGHT: 63 IN

## 2021-06-04 VITALS — HEIGHT: 63 IN | BODY MASS INDEX: 41.11 KG/M2 | WEIGHT: 232 LBS

## 2021-06-04 NOTE — PROGRESS NOTES
Baby is moving well.    No lof . No bleeding.    Reviewed GBS negative.    Reviewed kick counts  Reviewed when to go to the hospital.

## 2021-06-04 NOTE — PROGRESS NOTES
Doing well.  No problems.    She is ready to give birth.    She denies any problems.    Cervix is posterior behind the baby's head.

## 2021-06-05 NOTE — PROGRESS NOTES
"S:  See Pratik is a 29 y.o. female who comes to the clinic today for  1.  Postpartum from a vaginal deliery.    No menses yet.  She stopped bleeding after 4 weeks.  She is eating well.  She is not yet exercising. She tried to do exercise and her stomach hurt.  She has some mild stomach pain.  No trouble controlling her bowels or her bladder.  Her bowels were kind of hard.  That was after she finished with her month of chicken and rice.  She still has a skin tag in her rectal area.  She did see some rectal bleeding.  She is not eating many vegetables.  She deines any depression or sadness.  She returns to work in march.  She did have intercourse, and did not use protection . She and her partner do not plan ot use anything other than the withdrawal method.  She does not want another pregnancy at this time.    Prior to getting pregnant, she did have irregular menses .     I reviewed the pertinent family, social, surgical, medical history.    appt next week with dr munoz to plan for omental biopsy.      O:  /78   Pulse 73   Temp 98.3  F (36.8  C) (Oral)   Ht 5' 2.76\" (1.594 m)   Wt (!) 232 lb (105.2 kg)   LMP 04/10/2019 (Approximate)   SpO2 97%   BMI 41.42 kg/m    Gen: no acute distress  Neck:  Supple, no lad, no thyromegaly or nodules.    Heart:  Regular rate and rhythm.  No m/r/g  Breast exam is normal  Lungs: cta bilaterally, no wheezes or rhonchi.  Good air inspiration  Abdomen:  No masses or organomegaly  Extremities:  No edema.     Skin:  No rashes    Rectal exam: Demonstrates 2 hemorrhoids externally.  No fissures are noted.  These 2 hemorrhoids are soft and nontender.  Psych:  Normal.  No depression noted.        Patient Active Problem List   Diagnosis     Obese     Pregnant     Postpartum hemorrhage of vagina     Anemia     Complex ovarian cyst     S/P laparoscopy     Granulosa cell tumor     Vaginal delivery     Current Outpatient Medications on File Prior to Visit   Medication Sig Dispense Refill "     ibuprofen (ADVIL,MOTRIN) 800 MG tablet Take 1 tablet (800 mg total) by mouth every 8 (eight) hours. 30 tablet 0     [DISCONTINUED] docusate sodium (COLACE) 100 MG capsule Take 1 capsule (100 mg total) by mouth 2 (two) times a day as needed for constipation. 60 capsule 0     [DISCONTINUED] prenatal vit-iron fum-folic ac (PRENATAL VITAMIN) 27 mg iron- 0.8 mg Tab tablet Take 1 tablet by mouth daily. 90 tablet 12     No current facility-administered medications on file prior to visit.           No results found for this or any previous visit (from the past 48 hour(s)).     No images are attached to the encounter or orders placed in the encounter.       Assessment/Plan:  1. Vaginal delivery    - docusate sodium (COLACE) 100 MG capsule; Take 1 capsule (100 mg total) by mouth 2 (two) times a day as needed for constipation.  Dispense: 60 capsule; Refill: 0    2.  Morbid obesity:  Work on increasing vegetables.  Decreasing rice, bread, pasta, noodles and sugary foods.  Increase activity.      3. External hemorrhoids  Increase fiber in the diet.  Increase water intake  Keep stools soft.    - hydrocortisone (PREPARATION H HYDROCORTISONE) 1 % cream; Use over hemorrhoids twice daily  Dispense: 30 g; Refill: 0    4. Granulosa cell tumor  Follow-up with gynecology oncology as scheduled for her omental biopsy  I did discuss with her that I recommend that she not get pregnant at least until things are figured out with her granulosa cell tumor.  We did talk about the limitations of the withdrawal method.  I did encourage her to talk with her  about using condoms at a minimum until    5. Postpartum care and examination  The prognosis and plan for follow-up is figured out with the tumor.  She was in agreement with this.  I did recommend that she continue taking her prenatal vitamin at this time.  I did talk with her about making sure that she does not get a menses or is getting them very infrequently such as less than every  6 months, that she follow-up with me to discuss hormonal control of her menses versus metformin.  We talked about the risks for endometrial cancer with irregular or very infrequent menses.  I did encourage weight loss.  Encouraged regular physical exercise  Encouraged healthy diet.    Reviewed kegel exercises  Reviewed depression post partum, and when to seek help.    - prenatal vit-iron fum-folic ac (PRENATAL VITAMIN) 27 mg iron- 0.8 mg Tab tablet; Take 1 tablet by mouth daily.  Dispense: 90 tablet; Refill: 12          Vanessa Fregoso   1/30/2020 10:59 AM

## 2021-06-06 NOTE — TELEPHONE ENCOUNTER
Triage does not do outbound calls.  Ques are over 2 hour wait.. Call patient and add them to ques.    If it is an emergency they need to go to the Emergency room and not wait    Maria Luisa Rogers RN  Triage Nurse Advisor

## 2021-06-06 NOTE — ANESTHESIA POSTPROCEDURE EVALUATION
Patient: Mahesh Pratik  Procedure(s):  LAPAROSCOPIC OMENTECTOMY, WASHINGS, RIGHT SALPINGECTOMY  Anesthesia type: general    Patient location: PACU  Last vitals:   Vitals Value Taken Time   /66 2/25/2020  2:10 PM   Temp 37.1  C (98.7  F) 2/25/2020  1:25 PM   Pulse 60 2/25/2020  2:10 PM   Resp 11 2/25/2020  2:10 PM   SpO2 95 % 2/25/2020  2:10 PM   Vitals shown include unvalidated device data.  Post vital signs: stable  Level of consciousness: awake and responds to simple questions  Post-anesthesia pain: pain controlled  Post-anesthesia nausea and vomiting: no  Pulmonary: unassisted, return to baseline  Cardiovascular: stable and blood pressure at baseline  Hydration: adequate  Anesthetic events: no    QCDR Measures:  ASA# 11 - Nidia-op Cardiac Arrest: ASA11B - Patient did NOT experience unanticipated cardiac arrest  ASA# 12 - Nidia-op Mortality Rate: ASA12B - Patient did NOT die  ASA# 13 - PACU Re-Intubation Rate: ASA13B - Patient did NOT require a new airway mgmt  ASA# 10 - Composite Anes Safety: ASA10A - No serious adverse event    Additional Notes:

## 2021-06-06 NOTE — ANESTHESIA PREPROCEDURE EVALUATION
Anesthesia Evaluation      Patient summary reviewed   No history of anesthetic complications     Airway   Mallampati: III  Neck ROM: full   Pulmonary - negative ROS and normal exam                          Cardiovascular - negative ROS and normal exam   Neuro/Psych - negative ROS     Endo/Other    (+) obesity (morbid),      GI/Hepatic/Renal - negative ROS           Dental    (+) caps                       Anesthesia Plan  Planned anesthetic: general endotracheal  50 mg ketamine IV on induction.    ASA 3   Induction: intravenous   Anesthetic plan and risks discussed with: patient  Anesthesia plan special considerations: antiemetics,   Post-op plan: routine recovery

## 2021-06-06 NOTE — ANESTHESIA CARE TRANSFER NOTE
Last vitals:   Vitals:    02/25/20 1325   BP: 132/71   Pulse: 83   Resp: 24   Temp: 37.1  C (98.7  F)   SpO2: 100%     Patient's level of consciousness is drowsy  Spontaneous respirations: yes  Maintains airway independently: yes  Dentition unchanged: yes  Oropharynx: oropharynx clear of all foreign objects    QCDR Measures:  ASA# 20 - Surgical Safety Checklist: WHO surgical safety checklist completed prior to induction    PQRS# 430 - Adult PONV Prevention: 4558F - Pt received => 2 anti-emetic agents (different classes) preop & intraop  ASA# 8 - Peds PONV Prevention: NA - Not pediatric patient, not GA or 2 or more risk factors NOT present  PQRS# 424 - Nidia-op Temp Management: 4559F - At least one body temp DOCUMENTED => 35.5C or 95.9F within required timeframe  PQRS# 426 - PACU Transfer Protocol: - Transfer of care checklist used  ASA# 14 - Acute Post-op Pain: ASA14B - Patient did NOT experience pain >= 7 out of 10

## 2021-06-06 NOTE — TELEPHONE ENCOUNTER
I called patient.     She reports hard time breathing and kind of weak last night and again today.    Had this happen once last month.  Better in general today.    No fever or chills. No cough.    She had surgery last month for ovarian tumor (granulosa cell carcinoma), but was not started on any meds or treatments after that.  Not on any meds at all at this time.    Not positional.    Better when she is busy/doing things.    No edema.    She is worried about heart problem or low blood pressure.    We discussed today that since her symptoms are improving, are not exertional, and not not accompanied by other red flag symptoms, we could either evaluate her in clinic today, watch and see how things go, or have her go to the emergency room.  She would prefer to hold off on formal evaluation at this time.  We did discuss that if her symptoms at all worsen, she should be evaluated in person.  If her symptoms are severe, should promptly go to the emergency room.  Patient understands and agrees.    Routing to PCP as FYSANDY.

## 2021-06-06 NOTE — TELEPHONE ENCOUNTER
"Pt states she is having \"heart problems\" where it feels like it skips a beat, or she wondered if she has low blood pressure.  She wondered if she can be seen or if RT would rather call.  "

## 2021-06-08 NOTE — PRE-PROCEDURE
Procedure Name: discussed right port place with sedation   Date/Time: 5/26/2020 12:30 PM  Written consent obtained?: Yes  Risks and benefits: Risks, benefits and alternatives were discussed  Consent given by: patient  Expected level of sedation: moderate  ASA Class: Class 3- Severe systemic disease, definite functional limitations  Mallampati: Grade 2- soft palate, base of uvula, tonsillar pillars, and portion of posterior pharyngeal wall visible  Patient states understanding of procedure being performed: Yes  Patient's understanding of procedure matches consent: Yes  Procedure consent matches procedure scheduled: Yes  Appropriately NPO: yes  Lungs: lungs clear with good breath sounds bilaterally  Heart: normal heart sounds and rate  History & Physical reviewed: History and physical reviewed and no updates needed  Statement of review: I have reviewed the lab findings, diagnostic data, medications, and the plan for sedation

## 2021-06-08 NOTE — PROGRESS NOTES
Doing well.  No problems.    No bleeding ,cramping, or lof.    Energy is ok.  Eating is ok.    She is drinking a lot of water.    US scheduled already.

## 2021-06-08 NOTE — PROGRESS NOTES
Rounding completed on patient. Plan of care reviewed with patient. Side rails up. Call light within reach.

## 2021-06-08 NOTE — PRE-PROCEDURE
Pre-Procedure Negative COVID Test     See Pratik  COVID-19 PCR Results    COVID-19 PCR Results 5/23/20 5/23/20    1401 1401   2019-nCOV Test received-See reflex to IDDL test SARS CoV2 (COVID-19) Virus RT-PCR    SARS-CoV-2 PCR Result  NEGATIVE      Comments are available for some flowsheets but are not being displayed.             Patient Notification  Patient notified of the negative COVID test result    Patient Information  Patient informed of the no visitor policy  Patient instructed to continue to self-quarantine prior to procedure  Patient informed to contact the ordering provider if the following symptoms develop prior to procedure:   Fever  Cough  Shortness of Breath  Sore throat   Runny or stuffy nose  Muscle or body aches  Headaches  Fatigue  Vomiting or diarrhea   Rash          Vania Donahue     Patient verbalized understanding of instructions

## 2021-06-09 NOTE — PROGRESS NOTES
US reviewed.    She is doing well.    She has some sobr with standing for a long time.  This improves with cold air.  No pain in her chest.   She is eating pretty healthy.    She is thinking they might have another baby after this one.    Encouraged to cut down on simple carbs.  Increase activity.

## 2021-06-09 NOTE — PROGRESS NOTES
She has been craving a lot of simple carbs.    She denies any bleeding, cramping, or lof.    She has not talked about birth control after this baby.  She is not sure if she wants another baby right away.  She thinks she would like 4 babies in total, but wants to lose some weight after this.    Encouraged daily walking.  Encouraged limiting simple carbs  Discuss baby cares at next visit.   Discussed importance of breastfeeding at this visit.  Encouraged exclusive, or as much breastfeeding/pumping as she is able to do.    1 hour gct, uai, hgb, syphilis at next visit

## 2021-06-10 NOTE — PROGRESS NOTES
No bleeding, no lof.  No urinary complaints.  Some occasional vaginal itching.  No unusual discharge.  Advised to try some monistat, and to stop using a lot of soap.  She complains of more itching when things are dry.    Baby is moving well.  She has cut back on rice portions. Is still unable to exercise.    Labs reviewed today.      tdap given today.

## 2021-06-10 NOTE — PROGRESS NOTES
Discussed fish intake  Advised healthy diet without carbohydrates.    US for position at next visit.    gbs at next visit.

## 2021-06-10 NOTE — PROGRESS NOTES
Doing well.  No problems.  She does have some low back pain.  Worse at work.  Discussed low back pain.    She is not walking most days.    She has cut out noodles. She is still eating rice.  Encouraged to cut out all simple carbs and go to fruit, veggies and fruit.    1 hour gct done today.    Encouraged increased activity and walking  She plans to take 6 weeks post partum and work up to the time that she delivers.    tdap at next visit.

## 2021-06-11 NOTE — PROGRESS NOTES
Doing well.  No swelling.    She is having some meliza choi ctx.    Vertex on US  gbs done today.

## 2021-06-11 NOTE — PROGRESS NOTES
No bleeding, some mild cramping last night.  No lof.    Baby is moving well.    She is drinking well.  She has changed her diet a little bit.    gbs negative result was discussed today.

## 2021-06-11 NOTE — PROGRESS NOTES
Discussed risks of a shoulder dystocia with the patient, including baby's death, Erb's palsy, anoxia and brain damage.  Patient says that she has gained 7-1/2 pounds in the last 2 weeks due to eating too much.  This brings her to a total weight gain of over 33 pounds for this pregnancy on top of already being overweight.  She denies any bleeding, cramping, loss of fluid.  Her baby is moving well.  We will obtain an ultrasound for estimated fetal weight.  I will have the patient return on Friday to discuss these results and form a plan with her.  We did discuss that if this baby is very large on ultrasound I will refer her to OB for consultation for consideration of an elective .  I did encourage her to limit her simple carbohydrate intake.

## 2021-06-12 NOTE — PROGRESS NOTES
"S:  26 yo female who is here for a postpartum visit.  She denies any sexual activity.  No problems with bowel or bladder sx.  Mood is good.  Baby is good.  She has not yet gone back to work.  She is eating well.  Her  will watch her kids when she goes back to work.  Her  works nights.    She has not started doing any exercise.    She declines any birth control at this time.    Last delivery was complicated by a postpartum hemorrhage which required methergine, pitocin, and cytotec.    Feels safe at home.      O:  /68  Pulse 72  Temp 98.4  F (36.9  C) (Oral)   Resp 16  Ht 5' 3\" (1.6 m)  Wt 221 lb (100.2 kg)  LMP 09/29/2016 (Within Weeks)  Breastfeeding? No  BMI 39.15 kg/m2  Gen: no acute distress  Neck:  Supple, no lad, no thyromegaly or nodules.    Heart:  Regular rate and rhythm.  No m/r/g  Breast exam is normal  Lungs: cta bilaterally, no wheezes or rhonchi.  Good air inspiration  Abdomen:  No masses or organomegaly  Extremities:  No edema.     Skin:  No rashes  Genitourinary Exam:   Vulva: normal skin.  No lesions noted.  Nontender.    Urethral meatus: normal size and location, no lesions or discharge   Urethra: no tenderness or masses   Bladder: no fullness or tenderness   Vagina: normal appearance, physiologic discharge. No evidence of cystocele or rectocele.   Cervix: normal appearance, no lesions, no cervical motion tenderness   Uterus: normal size and position, mobile, non-tender   Pap smear obtained: no  Adnexa: no palpable masses bilaterally   Rectal exam: deferred   Psych:  Normal.  No depression noted.        Patient Active Problem List   Diagnosis     Obese     Pregnant     Vaginal delivery     Postpartum hemorrhage of vagina     Anemia     Current Outpatient Prescriptions on File Prior to Visit   Medication Sig Dispense Refill     docusate sodium (COLACE) 100 MG capsule One tablet one-two times daily as needed for hard stools. 42 capsule 2     ferrous sulfate 325 (65 FE) MG " tablet Take 1 tablet (325 mg total) by mouth daily with breakfast. 90 tablet 0     ibuprofen (ADVIL,MOTRIN) 800 MG tablet One tablet three times a day as needed for pain 60 tablet 3     prenatal vitamin iron-folic acid 27mg-0.8mg (PRENATAL S) 27 mg iron- 800 mcg Tab tablet Take 1 tablet by mouth daily. 90 tablet 4     witch hazel (TUCKS) 12.5-50 % PadM Use every hour as needed for perineal pain 100 each 2     No current facility-administered medications on file prior to visit.           No results found for this or any previous visit (from the past 48 hour(s)).      Assessment/Plan:  1. Anemia  Advised high iron diet.    Continue with pnv.    - Hemoglobin    2. Postpartum care and examination  Declines birth control at this time.    HPV vaccine given  Encouraged healthy diet, especially given that she is considering another pregnancy.  Continue with pnv.      Advised to keep from getting pregnant for at least 1 year to decrease chance of pph.    The following high BMI interventions were performed this visit: encouragement to exercise and prescribed dietary intake      Vanessa Fregoso   8/8/2017 1:04 PM

## 2021-06-16 PROBLEM — D64.9 ANEMIA: Status: ACTIVE | Noted: 2017-06-30

## 2021-06-16 PROBLEM — D39.10 GRANULOSA CELL TUMOR: Status: ACTIVE | Noted: 2019-04-23

## 2021-06-16 PROBLEM — N83.299 COMPLEX OVARIAN CYST: Status: ACTIVE | Noted: 2018-11-08

## 2021-06-16 PROBLEM — I51.7 CARDIOMEGALY: Status: ACTIVE | Noted: 2020-08-12

## 2021-06-16 PROBLEM — R79.89 ELEVATED LFTS: Status: ACTIVE | Noted: 2020-08-12

## 2021-06-16 PROBLEM — Z98.890 S/P LAPAROSCOPY: Status: ACTIVE | Noted: 2019-02-23

## 2021-06-18 NOTE — LETTER
Letter by Mary Turner at      Author: Mary Turner Service: -- Author Type: --    Filed:  Encounter Date: 1/23/2019 Status: (Other)       January 23, 2019       See Pratik  [address] [place] [address]    Dear See Pratik:    We are pleased to provide you with secure, online access to medical information for you and your family within [facility]. Per your request, we have expanded your account to allow access to the records of the following family members:              Candido Zepeda Henrinickolascorina Harkins (privilege ends on 2/22/2027.)            Natalie MARCIAL Shahana (privilege ends on 6/27/2029.)     How Do I Log In?  1. In your Internet browser, go to [employer]MatchMine  2. Log into [facility] using your [facility] Username and Password.  3. Click Sign In.        How Do I Access a Family Member's Account?  4. Select the account you want to access by clicking the Nikolski with the appropriate patient's name at the top of your screen.   5. You will see a disclaimer page letting you know that you will be viewing a family member's record. Review the disclaimer and then click Accept Proxy Access Disclaimer to proceed.  6. Once you switch to viewing a family member's record, you can navigate to [facility] pages the same way you would for yourself. You can return to your own account by clicking the Nikolski at the top of the screen with your name on it.    7. To customize the colors and names of the linked accounts, you can select Personalize from the Profile dropdown menu at the top of the screen, then click the Edit button to make changes.     Additional Information  If you have questions, visit [email or web address], e-mail Radio [email or web address] or call [phone] to talk to our Quipper staff. Remember, [facility] is NOT to be used for urgent needs. For medical emergencies, dial 911.

## 2021-06-19 NOTE — LETTER
Letter by Vanessa Fregoso MD at      Author: Vanessa Fregoso MD Service: -- Author Type: --    Filed:  Encounter Date: 10/8/2019 Status: Signed               [address]  [place] [address]   PHONE [phone]  [phone]     October 8, 2019  See Pratik  [address] [facility] [address]    Dear See:    Below are the results from your recent visit.  Your results are normal.      Resulted Orders   Thyroid Cascade   Result Value Ref Range    TSH 1.22 0.30 - 5.00 uIU/mL         If you have any questions or concerns, please do not hesitate to call.    Sincerely,      Vanessa Fregoso MD  October 8, 2019        j

## 2021-06-19 NOTE — LETTER
Letter by Vanessa Fregoso MD at      Author: Vanessa Fregoso MD Service: -- Author Type: --    Filed:  Encounter Date: 9/8/2019 Status: (Other)               [address]  [place] [address]   PHONE [phone]  [phone]     September 8, 2019  See Christie  [address]   [place] [address]    Dear See:    Below are the results from your recent visit.  Your results are essentially normal.  Your hemoglobin is still normal for a pregnant woman.  Your blood sugar is normal.      Resulted Orders   Glucose,Gestational Challenge (1 Hour)   Result Value Ref Range    Glucose, Gestational Challenge 1hr 105 70 - 139 mg/dL    Patient Fasting > 8hrs? No    Hemoglobin   Result Value Ref Range    Hemoglobin 11.5 (L) 12.0 - 16.0 g/dL   RPR   Result Value Ref Range    Treponema Antibody (Syphilis) Negative Negative   Urinalysis-UC if Indicated   Result Value Ref Range    Color, UA Yellow Colorless, Yellow, Straw, Light Yellow    Clarity, UA Cloudy (!) Clear    Glucose, UA Negative Negative    Bilirubin, UA Negative Negative    Ketones, UA Negative Negative    Specific Gravity, UA >=1.030 1.005 - 1.030    Blood, UA Negative Negative    pH, UA 6.0 5.0 - 8.0    Protein, UA 30 mg/dL (!) Negative mg/dL    Urobilinogen, UA 1.0 E.U./dL 0.2 E.U./dL, 1.0 E.U./dL    Nitrite, UA Negative Negative    Leukocytes, UA Moderate (!) Negative    Bacteria, UA Many (!) None Seen hpf    RBC, UA None Seen None Seen, 0-2 hpf    WBC, UA 10-25 (!) None Seen, 0-5 hpf    Squam Epithel, UA 25-50 (!) None Seen, 0-5 lpf    WBC Clumps Present (!) None Seen    Narrative    Urine Culture ordered based on [facility] criteria   Culture, Urine   Result Value Ref Range    Culture No Growth          If you have any questions or concerns, please do not hesitate to call.    Sincerely,      Vanessa Fregoso MD  September 8, 2019

## 2021-06-19 NOTE — LETTER
Letter by Vanessa Fregoso MD at      Author: Vanessa Fregoso MD Service: -- Author Type: --    Filed:  Encounter Date: 7/9/2019 Status: (Other)               [address]  [place] [address]   PHONE [phone]  [phone]     July 9, 2019  See Christie  [address] [place] [address]    Dear See:    Below are the results from your recent visit.  Your results are normal.      Resulted Orders   ABO/RH Typing (OP order)   Result Value Ref Range    HML ABO/Rh Typing B POS     HML ABO/Rh Repeat Typing B POS    Hepatitis B Surface antigen (HBsAG)   Result Value Ref Range    Hepatitis B Surface Ag Negative Negative   HIV Antigen/Antibody Screening Cascade   Result Value Ref Range    HIV Antigen / Antibody Negative Negative    Narrative    Method is Abbott HIV Ag/Ab for the detection of HIV p24 antigen, HIV-1 antibodies and HIV-2 antibodies.   HML Antibody Screen   Result Value Ref Range    HML Antibody Screen Negative Negative   Lead, Blood   Result Value Ref Range    Lead  <5.0 ug/dL      Comment:      Reflex testing sent to [facility]. Result to be reported on the separate reflexed test code.      Collection Method Venous     Lead Retest No    RPR   Result Value Ref Range    Treponema Antibody (Syphilis) Negative Negative   Rubella Immune Status (IgG)   Result Value Ref Range    Rubella Antibody, IgG Equivocal     Narrative    Negative: Absence of detectable rubella virus IgG antibodies. A negative result presumes that immunity has not been acquired.     Equivocal: Suggest recollection.    Positive: Considered positive for IgG antibodies to rubella virus.   Urinalysis Macroscopic   Result Value Ref Range    Color, UA Yellow Colorless, Yellow, Straw, Light Yellow    Clarity, UA Clear Clear    Glucose, UA Negative Negative    Bilirubin, UA Negative Negative    Ketones, UA Negative Negative    Specific Gravity, UA 1.025 1.005 - 1.030    Blood, UA Large  (!) Negative    pH, UA 5.5 5.0 - 8.0    Protein, UA Negative Negative mg/dL    Urobilinogen, UA 0.2 E.U./dL 0.2 E.U./dL, 1.0 E.U./dL    Nitrite, UA Negative Negative    Leukocytes, UA Trace (!) Negative   Culture, Urine   Result Value Ref Range    Culture No Growth    Drugs of Abuse 1,Urine   Result Value Ref Range    Amphetamines Screen Negative Screen Negative    Benzodiazepines Screen Negative Screen Negative    Opiates Screen Negative Screen Negative    Phencyclidine Screen Negative Screen Negative    THC Screen Negative Screen Negative    Barbiturates Screen Negative Screen Negative    Cocaine Metabolite Screen Negative Screen Negative    Oxycodone Screen Negative Screen Negative    Creatinine, Urine 150.4 mg/dL    Narrative    Drug                  Screening Threshold    Amphetamines           1000 ng/mL  Benzodiazepine          200 ng/mL  Opiates                 300 ng/mL  Phencyclidine            25 ng/mL  THC Metabolite           50 ng/mL  Barbiturates            200 ng/mL  Cocaine Metabolite      150 ng/mL  Oxycodone               100 ng/mL    Screening results are to be used only for medical purposes.  Unconfirmed screening results must not be used for non-  medical purposes.   HM1 (CBC with Diff)   Result Value Ref Range    WBC 8.7 4.0 - 11.0 thou/uL    RBC 4.22 3.80 - 5.40 mill/uL    Hemoglobin 12.7 12.0 - 16.0 g/dL    Hematocrit 38.0 35.0 - 47.0 %    MCV 90 80 - 100 fL    MCH 30.1 27.0 - 34.0 pg    MCHC 33.4 32.0 - 36.0 g/dL    RDW 15.3 (H) 11.0 - 14.5 %    Platelets 208 140 - 440 thou/uL    MPV 11.2 8.5 - 12.5 fL    Neutrophils % 73 (H) 50 - 70 %    Lymphocytes % 20 20 - 40 %    Monocytes % 5 2 - 10 %    Eosinophils % 1 0 - 6 %    Basophils % 1 0 - 2 %    Neutrophils Absolute 6.3 2.0 - 7.7 thou/uL    Lymphocytes Absolute 1.7 0.8 - 4.4 thou/uL    Monocytes Absolute 0.5 0.0 - 0.9 thou/uL    Eosinophils Absolute 0.1 0.0 - 0.4 thou/uL    Basophils Absolute 0.0 0.0 - 0.2 thou/uL   Wet Prep, Vaginal    Result Value Ref Range    Yeast Result No yeast seen No yeast seen    Trichomonas No Trichomonas seen No Trichomonas seen    Clue Cells, Wet Prep No Clue cells seen No Clue cells seen   Gynecologic Cytology (PAP Smear)   Result Value Ref Range    Case Report       Gynecologic Cytology Report                       Case: [id number]                                   Authorizing Provider:  Vanessa Fregoso MD   Collected:           07/05/2019 1602              Ordering Location:     [facility]            Received:            07/05/2019 1602                                     Medicine/OB                                                                  First Screen:          Garrett Flores CT                                                                           (ASCP)                                                                       Rescreen:              Adrianne Shukla CT                                                                            (ASCP)                                                                       Specimen:    SUREPATH PAP, SCREENING, Endocervical/cervical                                             Interpretation  Negative for squamous intraepithelial lesion or malignancy.      Negative for squamous intraepithelial lesion or malignancy    Result Flag Normal Normal    Specimen Adequacy       Satisfactory for evaluation, endocervical/transformation zone component present    HPV Reflex? Yes if Abnormal     HIGH RISK No     LMP/Menopause Date more than 15 week     Abnormal Bleeding No     Pt Status pregnant     Birth Control/Hormones None     Previous Normal/Date 2016     Prev Abn Date/Dx normal     Cervical Appearance Normal    Glycosylated Hemoglobin A1c   Result Value Ref Range    Hemoglobin A1c 5.3 3.5 - 6.0 %   Glucose   Result Value Ref Range    Glucose 62 (L) 70 - 99 mg/dL    Patient Fasting > 8hrs? Yes     Narrative    Fasting Glucose reference range is  "70-99 mg/dL per  American Diabetes Association (ADA) guidelines.   Chlamydia trachomatis & Neisseria gonorrhoeae, Amplified Detection   Result Value Ref Range    Chlamydia trachomatis, Amplified Detection Negative Negative    Neisseria gonorrhoeae, Amplified Detection Negative Negative   Lead, Blood, Venouos   Result Value Ref Range    Lead, Blood (Venous) <2.0 0.0 - 4.9 ug/dL      Comment:      INTERPRETIVE INFORMATION: Lead, Blood (Venous)    Elevated results may be due to skin or collection-related   contamination, including the use of a noncertified lead-free tube.   If contamination concerns exist due to elevated levels of blood   lead, confirmation with a second specimen collected in a certified   lead-free tube is recommended.    Information sources for reference intervals and interpretive   comments include the \"CDC Response to the 2012 Advisory Committee   on Childhood Lead Poisoning Prevention Report\" and the   \"Recommendations for Medical Management of Adult Lead Exposure,   Environmental Health Perspectives, 2007.\" Thresholds and time   intervals for retesting, medical evaluation, and response vary by   state and regulatory body. Contact your State Department of Health   and/or applicable regulatory agency for specific guidance on   medical management recommendations.         Age            Concentration   Comment    All ages       5-9.9 ug/dL     Adverse health   effects are                                  possible, particularly in                                 children under 6 years of                                 age and pregnant women.                                 Discuss health risks                                 associated with continued                                 lead exposure. For children                                 and women who are or may                                 become pregnant, reduce                                 lead exposure.                 All ages        " 10-19.9 ug/dL  Reduced lead exposure and                                 increased biological                                 monitoring are recommended.    All ages        20-69.9 ug/dL  Removal from lead exposure                                 and prompt medical                                 evaluation are recommended.                                 Consider chelation therapy                                 when concentrations exceed                                   50 ug/dL and symptoms of                                 lead toxicity are present.    Less than 19     Greater than  Critical. Immediate medical  years of age     44.9 ug/dL    evaluation is recommended.                                 Consider chelation therapy                                  when symptoms of lead                                 toxicity are present.    Greater than 19  Greater than  Critical. Immediate medical  years of age     69.9 ug/dL    evaluation is recommended                                 Consider chelation therapy                                 when symptoms of lead                                  toxicity are present.    Test developed and characteristics determined by [facility]. See Compliance Statement B: [employer]  Performed by [facility],  [address] [place] [id number] [phone]  [email or web address], Garfield Levin MD, Lab. Director         If you have any questions or concerns, please do not hesitate to call.    Sincerely,      Vanessa Fregoso MD  July 9, 2019

## 2021-06-20 NOTE — PROGRESS NOTES
"OFFICE VISIT NOTE      Assessment & Plan   See Pratik is a 28 y.o. female with anemia, slowly improving on prenatal vits  Another pregnancy is desired, however she admits her body is not ready for a pregnancy yet. She agrees to get her periods \"straightened out\" with oral contraceptives for a few months and THEN try for conception.  Encouraged exercise and decreased weight to help her bleeding problems.    Diagnoses and all orders for this visit:    Contraception  -     Pregnancy, Urine    Postpartum hemorrhage of vagina    Obese    Anemia  -     Hemoglobin    Need for HPV vaccination  -     HPV vaccine 9 valent 3 dose IM    Irregular menses    Other orders  -     desogestrel-ethinyl estradiol (APRI) 0.15-0.03 mg per tablet; Take 1 tablet by mouth daily.  Dispense: 2 Package; Refill: 2        Naila Rodríguez MD              Subjective:   Chief Complaint:  Vaginal Bleeding (x2mo )    28 y.o. female.     1) she wonders why she has irregular periods, prolonged periods and trouble getting pregnant  She wants another pregnancy but has not thought about her own health    2) obesity - not working on losing weight    3) takes prenatal vits  Current Outpatient Prescriptions   Medication Sig     desogestrel-ethinyl estradiol (APRI) 0.15-0.03 mg per tablet Take 1 tablet by mouth daily.     ferrous sulfate 325 (65 FE) MG tablet Take 1 tablet (325 mg total) by mouth daily with breakfast.     ibuprofen (ADVIL,MOTRIN) 800 MG tablet One tablet three times a day as needed for pain     prenatal vitamin iron-folic acid 27mg-0.8mg (PRENATAL S) 27 mg iron- 800 mcg Tab tablet Take 1 tablet by mouth daily.     witch hazel (TUCKS) 12.5-50 % PadM Use every hour as needed for perineal pain       PSFHx: appropriate sections of PMH completed/filled in   Tobacco Status:  She  reports that she has never smoked. She has never used smokeless tobacco.    Review of Systems:  No fever.  No dysuria. All other systems negative except as noted " "above.    Objective:    /64 (Patient Site: Left Arm, Patient Position: Sitting, Cuff Size: Adult Regular)  Pulse 75  Temp 98.4  F (36.9  C) (Oral)   Resp 16  Ht 5' 3\" (1.6 m)  Wt (!) 232 lb 6.4 oz (105.4 kg)  LMP  (LMP Unknown)  SpO2 98%  Breastfeeding? No  BMI 41.17 kg/m2  GENERAL: No acute distress, obese    No exam  Hemoglobin checked  Urine preg neg    Spent 25 min face to face with patient with more the 50% spent in counseling, reviewing chart, and coordination of care and discussing PCOS, menses, obesity, pregnancy/pre-pregnancy counseling.        "

## 2021-06-21 NOTE — PROGRESS NOTES
"S:  SHe has been bleeding almost continuously for the last 2 months.  She was seen here in August and was started on birth control pills.  She took these for 1 month.  At that time her period was fine then she stopped the medication and she started bleeding almost every single day.  It is not really heavy bleeding.  She denies any dizziness.  She is tired but she says she is always tired.  She generally only sleeps about 5 hours every night.  She then takes a 2-3-hour nap during the day.      No excessive hirsutism.  She had a 20 pound weight gain since June.  No unusual rashes or skin changes.  She does have intermittent headaches but these go away when she lays down takes a nap.  No vision changes.  No blurry or double vision.  She does not have a headache every single day.  She has not really changed her diet.  She does continue to have some liquids that have calories in them.  She does not exercise.    She goes to bed at about midnight because her children are awake.  She then wakes up at 4:30 in the morning.    fam hx:  She does have a sister with infertility and possible PCO S.      Soc hx:  She is working in the [employer].   She is not using birth control.  She is ok with getting pregnant again.  Her partner would like another child.    O:  /88  Pulse 85  Temp 97.8  F (36.6  C) (Oral)   Resp 20  Ht 5' 3\" (1.6 m)  Wt (!) 232 lb (105.2 kg)  LMP 10/03/2018  SpO2 98%  Breastfeeding? No  BMI 41.1 kg/m2  Gen: no acute distress  Neck:  Supple, no lad, no carotid bruits.  No thyromegaly or nodules.    Ophthalmologic exam is normal today.  Extraocular movements are intact.  Heart:  Regular rate and rhythm.  No m/r/g  Lungs: cta bilaterally, no wheezes or rhonchi.  Good air inspiration  Abdomen:  No masses or organomegaly.  Non tender.  Obese .  Non distended.    Extremities:  No edema.     Deep tendon reflexes are symmetric and equal.  Skin does not reveal any rashes.        Patient Active Problem List "   Diagnosis     Obese     Pregnant     Vaginal delivery     Postpartum hemorrhage of vagina     Anemia     Current Outpatient Prescriptions on File Prior to Visit   Medication Sig Dispense Refill     desogestrel-ethinyl estradiol (APRI) 0.15-0.03 mg per tablet Take 1 tablet by mouth daily. 2 Package 2     ferrous sulfate 325 (65 FE) MG tablet Take 1 tablet (325 mg total) by mouth daily with breakfast. 90 tablet 0     ibuprofen (ADVIL,MOTRIN) 800 MG tablet One tablet three times a day as needed for pain 60 tablet 3     prenatal vitamin iron-folic acid 27mg-0.8mg (PRENATAL S) 27 mg iron- 800 mcg Tab tablet Take 1 tablet by mouth daily. 90 tablet 4     witch hazel (TUCKS) 12.5-50 % PadM Use every hour as needed for perineal pain 100 each 2     No current facility-administered medications on file prior to visit.           No results found for this or any previous visit (from the past 48 hour(s)).      Assessment/Plan:  1. Irregular menses  Concern for PCOS vs other pathology.    Rule out endocrine abnormality.    - Thyroid Cascade  - 17-Hydroxyprogesterone; Future  - Comprehensive metabolic panel; Future  - Pregnancy, urine; Future  - Prolactin; Future  - Testosterone, free, total; Future  - TSH; Future  - US Pelvis With Transvaginal Non OB; Future  - Androstenedione; Future  - DHEA-sulfate; Future  - Follicle stimulating hormone; Future  - Hemoglobin A1c; Future  - Luteinizing hormone; Future  - HM1(CBC and Differential)  - drospirenone-ethinyl estradiol (STACI, 28,) 3-0.03 mg per tablet; Take 1 tablet by mouth daily.  Dispense: 2 Package; Refill: 0  - HM1 (CBC with Diff)  - 17-Hydroxyprogesterone  - Comprehensive metabolic panel  - Pregnancy, urine  - Prolactin  - Testosterone, free, total  - TSH  - Androstenedione  - DHEA-sulfate  - Follicle stimulating hormone  - Hemoglobin A1c  - Luteinizing hormone    2. Weight gain  Encouraged her to cut out all liquids with calories.    Walk 10 minutes daily  Cut out bread,  cereal, rice, noodles.    - Thyroid Cascade  - 17-Hydroxyprogesterone; Future  - Comprehensive metabolic panel; Future  - Pregnancy, urine; Future  - Prolactin; Future  - Testosterone, free, total; Future  - TSH; Future  - US Pelvis With Transvaginal Non OB; Future  - Androstenedione; Future  - DHEA-sulfate; Future  - Follicle stimulating hormone; Future  - Hemoglobin A1c; Future  - Luteinizing hormone; Future  - 17-Hydroxyprogesterone  - Comprehensive metabolic panel  - Pregnancy, urine  - Prolactin  - Testosterone, free, total  - TSH  - Androstenedione  - DHEA-sulfate  - Follicle stimulating hormone  - Hemoglobin A1c  - Luteinizing hormone    3. Pregnancy  Pre conception counseling done today.    Encouraged her to work on weight loss prior to getting pregnant.    Encouraged prenatal vitamin.    - prenatal vitamin iron-folic acid 27mg-0.8mg (PRENATAL S) 27 mg iron- 800 mcg Tab tablet; Take 1 tablet by mouth daily.  Dispense: 90 tablet; Refill: 4    4. Need for immunization against influenza    - Influenza, Seasonal,Quad Inj, 36+ MOS          Vanessa Fregoso   10/12/2018 4:34 PM

## 2021-06-24 NOTE — ANESTHESIA CARE TRANSFER NOTE
Last vitals:   Vitals:    02/23/19 1110   BP: 119/56   Pulse: 75   Resp: 16   Temp: 37.1  C (98.8  F)   SpO2: 100%     Patient's level of consciousness is awake  Spontaneous respirations: yes  Maintains airway independently: yes  Dentition unchanged: yes  Oropharynx: oropharynx clear of all foreign objects    QCDR Measures:  ASA# 20 - Surgical Safety Checklist: WHO surgical safety checklist completed prior to induction    PQRS# 430 - Adult PONV Prevention: 4558F - Pt received => 2 anti-emetic agents (different classes) preop & intraop  ASA# 8 - Peds PONV Prevention: NA - Not pediatric patient, not GA or 2 or more risk factors NOT present  PQRS# 424 - Nidia-op Temp Management: 4559F - At least one body temp DOCUMENTED => 35.5C or 95.9F within required timeframe  PQRS# 426 - PACU Transfer Protocol: - Transfer of care checklist used  ASA# 14 - Acute Post-op Pain: ASA14B - Patient did NOT experience pain >= 7 out of 10

## 2021-06-24 NOTE — ANESTHESIA PREPROCEDURE EVALUATION
Anesthesia Evaluation      Patient summary reviewed   History of anesthetic complications     Airway   Mallampati: II  Neck ROM: full   Pulmonary - negative ROS and normal exam                          Cardiovascular - negative ROS and normal exam  Exercise tolerance: > or = 4 METS   Neuro/Psych - negative ROS     Endo/Other    (+) obesity,      GI/Hepatic/Renal - negative ROS      Other findings: Morbidly obese, has hemorrhagic cyst and was unstable in ER.  Received 2 liters IV fluids there, Hg dropped from 13 to 10.8 after this.  In trendelenberg on cart from ER, alert.  Nothing by mouth since yesterday.  K 4.0, GFR>60. Very small mouth opening.      Dental - normal exam                        Anesthesia Plan  Planned anesthetic: general endotracheal  Will have glidescope in room.  ASA 3 - emergent   Induction: intravenous   Anesthetic plan and risks discussed with: patient and spouse  Anesthesia plan special considerations: rapid sequence induction,   Post-op plan: routine recovery

## 2021-06-25 ENCOUNTER — MEDICAL CORRESPONDENCE (OUTPATIENT)
Dept: HEALTH INFORMATION MANAGEMENT | Facility: CLINIC | Age: 31
End: 2021-06-25
Payer: COMMERCIAL

## 2021-06-27 ENCOUNTER — HEALTH MAINTENANCE LETTER (OUTPATIENT)
Age: 31
End: 2021-06-27

## 2021-07-02 NOTE — H&P
H&P by Tayla Anderson Chi, PA-C at 5/26/2020 12:30 PM     Author: Tayla Anderson Chi, PA-C Service: Interventional Radiology Author Type: Physician Assistant    Filed: 5/26/2020 11:54 AM Date of Service: 5/26/2020 12:30 PM Status: Signed    : Tayla Anderson Chi, PA-C (Physician Assistant)         Interventional Radiology - History and Physical  5/26/2020    Procedure Requested: Port Placement  Requesting Provider: Adrianne Hua MD     HPI: Mahesh Christie is a 29 y.o. old female with history of ovarian cancer s/p lap omentectomy, right salpingectomy 2/25/2020 requested for port placement for long term IV access for blood draws and chemotherapy.      Denies any prior history of lines in neck, fevers, chills, chest pain, shortness of breath, loss of taste/smell, dry cough, recent COVID testing.      Imaging:   No recent relevant imaging     NPO Status: midnight   Anticoagulation/Antiplatelets/Bleeding tendencies: None   Antibiotics: Ancef 2 gram IV    Review of Systems: A comprehensive 10-point review of systems was performed. All systems were reviewed and negative with exception to those reported in the HPI.    PMH:  Past Medical History:   Diagnosis Date   ? Granulosa cell tumor of ovary    ? Migraine        PSH:  Past Surgical History:   Procedure Laterality Date   ? NV LAP,DIAGNOSTIC ABDOMEN N/A 2/23/2019    Procedure: LAPAROSCOPY, DIAGNOSTIC, RIGHT OOPHERECTOMY;  Surgeon: Leeann Elizabeth DO;  Location: [facility];  Service: Gynecology   ? NV LAP,DIAGNOSTIC ABDOMEN N/A 2/25/2020    Procedure: LAPAROSCOPIC OMENTECTOMY, WASHINGS, RIGHT SALPINGECTOMY;  Surgeon: Adrianne Hua MD;  Location: [facility];  Service: Gynecology Oncology       ALLERGIES  Patient has no known allergies.    MEDICATIONS:  Current Outpatient Medications on File Prior to Encounter   Medication Sig Dispense Refill   ? acetaminophen (TYLENOL) 325 MG tablet Take 2 tablets (650 mg total) by mouth every 6  "(six) hours as needed for pain.  0   ? ibuprofen (ADVIL,MOTRIN) 600 MG tablet Take 1 tablet (600 mg total) by mouth every 6 (six) hours as needed for pain. 20 tablet 0   ? ketoconazole (NIZORAL) 2 % cream Use over affected area twice daily 15 g 0   ? oxyCODONE (ROXICODONE) 5 MG immediate release tablet Take 1-2 tablets (5-10 mg total) by mouth every 6 (six) hours as needed for pain. 15 tablet 0   ? senna-docusate (SENNOSIDES-DOCUSATE SODIUM) 8.6-50 mg tablet Take 1-2 tablets by mouth 2 (two) times a day as needed for constipation.  0     No current facility-administered medications on file prior to encounter.        LABS:  INR (no units)   Date Value   02/23/2019 1.00     Hemoglobin (g/dL)   Date Value   02/25/2020 12.3     Platelets (thou/uL)   Date Value   12/18/2019 250     Creatinine (mg/dL)   Date Value   02/23/2019 0.71     Potassium (mmol/L)   Date Value   02/23/2019 4.0       EXAM:  /75   Pulse 90   Temp 98.8  F (37.1  C) (Oral)   Resp 14   Ht 5' 3\" (1.6 m)   Wt (!) 232 lb (105.2 kg)   SpO2 98%   BMI 41.10 kg/m    General: Stable. In no acute distress.  FROM of neck  Neuro: A&O x3.  Moves all extremities equally.  Resp: Lungs CTA bilaterally.  Cardio: S1S2 and reg, without murmur, clicks or rubs.  Abdomen:  Soft, non-distended and non-tender.  Skin:  Without excoriations, ecchymosis, erythema, lesions or open sores on neck/chest.    Pre-Sedation Assessment:  Mallampati Airway Classification: Class 2: upper half of tonsil fossa visible  Previous reaction to anesthesia/sedation: no  Sedation plan based on assessment: Moderate  Sleep Apnea: no  Dentures: no  COPD: no  ASA Classification: ASA 3 - Patient with moderate systemic disease with functional limitations      ASSESSMENT:  Ovarian Cancer     PLAN:    Discussed plan for RIGHT Port placement with sedation     The procedure, risks and moderate sedation were discussed with patient, all questions answered and patient agrees to proceed with the " procedure.  Written consent obtained.      [facility]  Interventional Radiology  [phone]

## 2021-07-14 PROBLEM — Z34.90 PREGNANT: Status: RESOLVED | Noted: 2017-06-28 | Resolved: 2020-03-18

## 2021-07-21 ENCOUNTER — TELEPHONE (OUTPATIENT)
Dept: FAMILY MEDICINE | Facility: CLINIC | Age: 31
End: 2021-07-21

## 2021-07-21 NOTE — TELEPHONE ENCOUNTER
Reason for Call:  Other appointment    Detailed comments: Preg. Confirmation -   First Day of Last Menstrual: 6/25/21  At Home Tests:  1  Will see any provider   Phone Number Patient can be reached at: Cell number on file:    Telephone Information:   Mobile [phone]       Best Time: Anytime    Can we leave a detailed message on this number? YES    Call taken on 7/21/2021 at 3:59 PM by Mony Ortiz

## 2021-07-22 NOTE — TELEPHONE ENCOUNTER
Can  please schedule pregnancy confirmation for CMT as CMT is alone until 8/2/21?  Thanks so much!

## 2021-08-02 ENCOUNTER — OFFICE VISIT (OUTPATIENT)
Dept: FAMILY MEDICINE | Facility: CLINIC | Age: 31
End: 2021-08-02
Payer: COMMERCIAL

## 2021-08-02 VITALS
SYSTOLIC BLOOD PRESSURE: 112 MMHG | HEIGHT: 63 IN | OXYGEN SATURATION: 99 % | WEIGHT: 240.75 LBS | BODY MASS INDEX: 42.66 KG/M2 | HEART RATE: 91 BPM | DIASTOLIC BLOOD PRESSURE: 72 MMHG | TEMPERATURE: 98.5 F

## 2021-08-02 DIAGNOSIS — Z32.01 PREGNANCY CONFIRMED BY POSITIVE URINE TEST: Primary | ICD-10-CM

## 2021-08-02 LAB — HCG UR QL: POSITIVE

## 2021-08-02 PROCEDURE — 81025 URINE PREGNANCY TEST: CPT | Performed by: FAMILY MEDICINE

## 2021-08-02 PROCEDURE — 99213 OFFICE O/P EST LOW 20 MIN: CPT | Performed by: FAMILY MEDICINE

## 2021-08-02 RX ORDER — PNV NO.95/FERROUS FUM/FOLIC AC 28MG-0.8MG
1 TABLET ORAL DAILY
Qty: 180 TABLET | Refills: 3 | Status: SHIPPED | OUTPATIENT
Start: 2021-08-02 | End: 2022-02-18

## 2021-08-02 ASSESSMENT — MIFFLIN-ST. JEOR: SCORE: 1774.77

## 2021-08-02 NOTE — PROGRESS NOTES
"chief complaint    HPI:  Mahesh Christie is a 31 year old female  presents requesting pregnancy test.  Patient's last menstrual period was 2021 (exact date).   Normal: yes  Contraception: stopped OCP May 2021  Home Pregnancy Test:  ~2021--positive    Last Pregnancy: 2019,   Symptoms: mild nausea starting today    Tobacco use: no  ETOH use:       Planned: no    MEDICATIONS:  Current Outpatient Medications   Medication     acetaminophen (TYLENOL) 325 MG tablet     No current facility-administered medications for this visit.           ALLERGIES:  No Known Allergies    SOCIAL HISTORY:  History   Smoking Status     Passive Smoke Exposure - Never Smoker   Smokeless Tobacco     Never Used     Comment: Spouse smokes outside         EXAM:  Vitals:    21 1309   BP: 112/72   BP Location: Right arm   Patient Position: Sitting   Cuff Size: Adult Regular   Pulse: 91   Temp: 98.5  F (36.9  C)   TempSrc: Oral   SpO2: 99%   Weight: 109.2 kg (240 lb 12 oz)   Height: 1.59 m (5' 2.6\")         GEN:  NAD  ABD:  FUNDUS: not palpable    FHT's by doptone: not heard    LABS:  Results for orders placed or performed in visit on 21   HCG Qual, Urine ([id number])     Status: Abnormal   Result Value Ref Range    hCG Urine Qualitative Positive (A) Negative       ASSESSMENT/PLAN:    Pregnancy confirmed by positive urine test    - HCG Qual, Urine ([id number])  - Prenatal Vit-Fe Fumarate-FA (PRENATAL VITAMIN) 27-0.8 MG TABS; Take 1 tablet by mouth daily       Dating by LMP:  EGA: 5+3 weeks    EDC: 2022    Start prenatal vitamins.  Healthy diet  Small, frequent meals if nauseated.  No medications other than tylenol unless okayed by doctor  Notify clinic if bleeding or pain.  Set up 1st OB visit.     The following portions of the patient's history were reviewed and updated as appropriate: allergies, current medications, past family history, past medical history, past social history, past surgical history and problem list.     "     @[facility] [place]@   8/2/2021

## 2021-09-07 ENCOUNTER — MYC MEDICAL ADVICE (OUTPATIENT)
Dept: FAMILY MEDICINE | Facility: CLINIC | Age: 31
End: 2021-09-07

## 2021-09-07 DIAGNOSIS — H10.45 CHRONIC ALLERGIC CONJUNCTIVITIS: Primary | ICD-10-CM

## 2021-09-07 DIAGNOSIS — D50.9 IRON DEFICIENCY ANEMIA, UNSPECIFIED IRON DEFICIENCY ANEMIA TYPE: ICD-10-CM

## 2021-09-07 RX ORDER — LORATADINE 10 MG/1
10 TABLET ORAL DAILY
Qty: 90 TABLET | Refills: 3 | Status: ON HOLD | OUTPATIENT
Start: 2021-09-07 | End: 2024-02-15

## 2021-09-07 RX ORDER — PRENATAL VIT/IRON FUM/FOLIC AC 27MG-0.8MG
1 TABLET ORAL DAILY
Qty: 90 TABLET | Refills: 3 | Status: SHIPPED | OUTPATIENT
Start: 2021-09-07 | End: 2022-02-18

## 2021-09-30 ENCOUNTER — PRENATAL OFFICE VISIT (OUTPATIENT)
Dept: FAMILY MEDICINE | Facility: CLINIC | Age: 31
End: 2021-09-30
Payer: COMMERCIAL

## 2021-09-30 VITALS
RESPIRATION RATE: 16 BRPM | OXYGEN SATURATION: 100 % | DIASTOLIC BLOOD PRESSURE: 66 MMHG | HEART RATE: 97 BPM | SYSTOLIC BLOOD PRESSURE: 116 MMHG | BODY MASS INDEX: 43.82 KG/M2 | WEIGHT: 244.25 LBS | TEMPERATURE: 98.5 F

## 2021-09-30 DIAGNOSIS — O09.91 ENCOUNTER FOR SUPERVISION OF HIGH RISK PREGNANCY IN FIRST TRIMESTER, ANTEPARTUM: ICD-10-CM

## 2021-09-30 DIAGNOSIS — Z34.01 ENCOUNTER FOR SUPERVISION OF NORMAL FIRST PREGNANCY IN FIRST TRIMESTER: ICD-10-CM

## 2021-09-30 DIAGNOSIS — E66.813 CLASS 3 OBESITY: ICD-10-CM

## 2021-09-30 DIAGNOSIS — Z23 NEED FOR IMMUNIZATION AGAINST INFLUENZA: Primary | ICD-10-CM

## 2021-09-30 LAB
ABO/RH(D): NORMAL
ALBUMIN UR-MCNC: NEGATIVE MG/DL
AMPHETAMINE-CLINIC: ABNORMAL
ANTIBODY SCREEN: NEGATIVE
APPEARANCE UR: ABNORMAL
BACTERIA #/AREA URNS HPF: ABNORMAL /HPF
BARBITURATES-CLINIC: ABNORMAL
BASOPHILS # BLD AUTO: 0.1 10E3/UL (ref 0–0.2)
BASOPHILS NFR BLD AUTO: 1 %
BENZODIAZEPINES-CLINIC: ABNORMAL
BILIRUB UR QL STRIP: NEGATIVE
BUPRENORPHINE-CLINIC: ABNORMAL
COCAINE-CLINIC: ABNORMAL
COLOR UR AUTO: YELLOW
ECSTASY-CLINIC: ABNORMAL
EOSINOPHIL # BLD AUTO: 0.2 10E3/UL (ref 0–0.7)
EOSINOPHIL NFR BLD AUTO: 2 %
ERYTHROCYTE [DISTWIDTH] IN BLOOD BY AUTOMATED COUNT: 12.8 % (ref 10–15)
FASTING STATUS PATIENT QL REPORTED: NO
GLUCOSE BLD-MCNC: 89 MG/DL (ref 70–125)
GLUCOSE UR STRIP-MCNC: NEGATIVE MG/DL
HCT VFR BLD AUTO: 37.3 % (ref 35–47)
HGB BLD-MCNC: 12.8 G/DL (ref 11.7–15.7)
HGB UR QL STRIP: NEGATIVE
HIV 1+2 AB+HIV1 P24 AG SERPL QL IA: NEGATIVE
IMM GRANULOCYTES # BLD: 0.1 10E3/UL
IMM GRANULOCYTES NFR BLD: 1 %
KETONES UR STRIP-MCNC: NEGATIVE MG/DL
LEUKOCYTE ESTERASE UR QL STRIP: ABNORMAL
LYMPHOCYTES # BLD AUTO: 2.1 10E3/UL (ref 0.8–5.3)
LYMPHOCYTES NFR BLD AUTO: 22 %
MARIJUANA-CLINIC: ABNORMAL
MCH RBC QN AUTO: 32.2 PG (ref 26.5–33)
MCHC RBC AUTO-ENTMCNC: 34.3 G/DL (ref 31.5–36.5)
MCV RBC AUTO: 94 FL (ref 78–100)
METHADONE METABOLITE-CLINIC: ABNORMAL
METHAMPHETAMINE-CLINIC: ABNORMAL
MONOCYTES # BLD AUTO: 0.6 10E3/UL (ref 0–1.3)
MONOCYTES NFR BLD AUTO: 6 %
NEUTROPHILS # BLD AUTO: 6.8 10E3/UL (ref 1.6–8.3)
NEUTROPHILS NFR BLD AUTO: 68 %
NITRATE UR QL: NEGATIVE
NRBC # BLD AUTO: 0 10E3/UL
NRBC BLD AUTO-RTO: 0 /100
OPIATES-CLINIC: ABNORMAL
OXIDANTS: NORMAL
OXYCODONE-CLINIC: ABNORMAL
PCP 25-CLINIC: ABNORMAL
PH UR STRIP: 5.5 [PH] (ref 5–7)
PH-CLINIC: 4 (ref 5–8)
PLATELET # BLD AUTO: 202 10E3/UL (ref 150–450)
RBC # BLD AUTO: 3.97 10E6/UL (ref 3.8–5.2)
RBC #/AREA URNS AUTO: ABNORMAL /HPF
SP GR UR STRIP: 1.01 (ref 1–1.03)
SP GR UR STRIP: 1.02 (ref 1–1.03)
SPECIMEN EXPIRATION DATE: NORMAL
SQUAMOUS #/AREA URNS AUTO: ABNORMAL /LPF
UROBILINOGEN UR STRIP-ACNC: 0.2 E.U./DL
WBC # BLD AUTO: 9.8 10E3/UL (ref 4–11)
WBC #/AREA URNS AUTO: ABNORMAL /HPF

## 2021-09-30 PROCEDURE — 86850 RBC ANTIBODY SCREEN: CPT | Performed by: FAMILY MEDICINE

## 2021-09-30 PROCEDURE — 82947 ASSAY GLUCOSE BLOOD QUANT: CPT | Performed by: FAMILY MEDICINE

## 2021-09-30 PROCEDURE — 99207 PR FIRST OB VISIT: CPT | Performed by: FAMILY MEDICINE

## 2021-09-30 PROCEDURE — 87340 HEPATITIS B SURFACE AG IA: CPT | Performed by: FAMILY MEDICINE

## 2021-09-30 PROCEDURE — 86762 RUBELLA ANTIBODY: CPT | Performed by: FAMILY MEDICINE

## 2021-09-30 PROCEDURE — 83655 ASSAY OF LEAD: CPT | Mod: 90 | Performed by: FAMILY MEDICINE

## 2021-09-30 PROCEDURE — 99213 OFFICE O/P EST LOW 20 MIN: CPT | Mod: 25 | Performed by: FAMILY MEDICINE

## 2021-09-30 PROCEDURE — 85025 COMPLETE CBC W/AUTO DIFF WBC: CPT | Performed by: FAMILY MEDICINE

## 2021-09-30 PROCEDURE — 86900 BLOOD TYPING SEROLOGIC ABO: CPT | Performed by: FAMILY MEDICINE

## 2021-09-30 PROCEDURE — 86901 BLOOD TYPING SEROLOGIC RH(D): CPT | Performed by: FAMILY MEDICINE

## 2021-09-30 PROCEDURE — 87389 HIV-1 AG W/HIV-1&-2 AB AG IA: CPT | Performed by: FAMILY MEDICINE

## 2021-09-30 PROCEDURE — 86803 HEPATITIS C AB TEST: CPT | Performed by: FAMILY MEDICINE

## 2021-09-30 PROCEDURE — 90471 IMMUNIZATION ADMIN: CPT | Performed by: FAMILY MEDICINE

## 2021-09-30 PROCEDURE — 90686 IIV4 VACC NO PRSV 0.5 ML IM: CPT | Performed by: FAMILY MEDICINE

## 2021-09-30 PROCEDURE — 36415 COLL VENOUS BLD VENIPUNCTURE: CPT | Performed by: FAMILY MEDICINE

## 2021-09-30 PROCEDURE — 86780 TREPONEMA PALLIDUM: CPT | Performed by: FAMILY MEDICINE

## 2021-09-30 PROCEDURE — 87086 URINE CULTURE/COLONY COUNT: CPT | Performed by: FAMILY MEDICINE

## 2021-09-30 PROCEDURE — 80305 DRUG TEST PRSMV DIR OPT OBS: CPT | Performed by: FAMILY MEDICINE

## 2021-09-30 RX ORDER — SWAB
1 SWAB, NON-MEDICATED MISCELLANEOUS DAILY
Qty: 90 CAPSULE | Refills: 3 | Status: ON HOLD | OUTPATIENT
Start: 2021-09-30 | End: 2024-02-15

## 2021-09-30 RX ORDER — PNV NO.95/FERROUS FUM/FOLIC AC 28MG-0.8MG
1 TABLET ORAL DAILY
Qty: 30 CAPSULE | Refills: 12 | Status: SHIPPED | OUTPATIENT
Start: 2021-09-30 | End: 2022-05-09

## 2021-09-30 NOTE — PROGRESS NOTES
SUBJECTIVE:     HPI:    This is a 31 year old female patient,  who presents for her first obstetrical visit.    DANIEL: 2022, by Last Menstrual Period. She stopped her ocp 1 month prior to getting pregnant.  She is 13w6d weeks.  Her cycles are regular.  Her last menstrual period was normal.   Since her LMP, she has had no complaints).   She denies nausea and vaginal bleeding.    Additional History: she is goingto have an MRI in November for follow up of ovarian neoplasm.    Her allergies are improved on her loratadine.      Have you travelled during the pregnancy?No  Have your sexual partner(s) travelled during the pregnancy?No  No trips planned.    She is breaking out in hives.    She got her covid vaccines, she got pfizer.  Her last dose was in July.      HISTORY:   Planned Pregnancy: Yes  Marital Status: Single  Occupation: outside the home. She works at [employer].  She is still in a clean room.   Living in Household: Spouse and Children    Past History:  Her past medical history   Past Medical History:   Diagnosis Date     Granulosa cell tumor of ovary      Migraine    .      She has a history of  Ovarian tumor, treated with chemotherapy.      Since her last LMP she denies use of alcohol, tobacco and street drugs.    Past medical, surgical, social and family history were reviewed and updated in [facility].        Current Outpatient Medications   Medication     acetaminophen (TYLENOL) 325 MG tablet     loratadine (CLARITIN) 10 MG tablet     Prenatal MV-Min-Fe Fum-FA-DHA (PRENATAL MULTIVITAMIN PLUS DHA) 27-0.8-250 MG CAPS     Prenatal Vit-Fe Fumarate-FA (PRENATAL MULTIVITAMIN W/IRON) 27-0.8 MG tablet     Prenatal Vit-Fe Fumarate-FA (PRENATAL VITAMIN) 27-0.8 MG TABS     vitamin D3 (CHOLECALCIFEROL) 10 MCG (400 UNIT) capsule     No current facility-administered medications for this visit.       ROS:   12 point review of systems negative other than symptoms noted below or in the HPI.      OBJECTIVE:     EXAM:  BP  116/66   Pulse 97   Temp 98.5  F (36.9  C) (Oral)   Resp 16   Wt 110.8 kg (244 lb 4 oz)   LMP 2021 (Exact Date)   SpO2 100%   BMI 43.82 kg/m   Body mass index is 43.82 kg/m .    GENERAL: healthy, alert and no distress  EYES: Eyes grossly normal to inspection, PERRL and conjunctivae and sclerae normal  HENT: ear canals and TM's normal, nose and mouth without ulcers or lesions  NECK: no adenopathy, no asymmetry, masses, or scars and thyroid normal to palpation  RESP: lungs clear to auscultation - no rales, rhonchi or wheezes  BREAST: normal without masses, tenderness or nipple discharge and no palpable axillary masses or adenopathy  CV: regular rate and rhythm, normal S1 S2, no S3 or S4, no murmur, click or rub, no peripheral edema and peripheral pulses strong  ABDOMEN: soft, nontender, no hepatosplenomegaly, no masses and bowel sounds normal  MS: no gross musculoskeletal defects noted, no edema  SKIN: no suspicious lesions or rashes  NEURO: Normal strength and tone, mentation intact and speech normal  PSYCH: mentation appears normal, affect normal/bright news  Fht:  150      ASSESSMENT/PLAN:       ICD-10-CM    1. Encounter for supervision of normal first pregnancy in first trimester  Z34.01 Prenatal MV-Min-Fe Fum-FA-DHA (PRENATAL MULTIVITAMIN PLUS DHA) 27-0.8-250 MG CAPS     vitamin D3 (CHOLECALCIFEROL) 10 MCG (400 UNIT) capsule     UA reflex to Microscopic     Urine Culture     Treponema Abs w Reflex to RPR and Titer     HIV Antigen Antibody Combo     Hepatitis B surface antigen     CBC with Platelets & Differential     Rubella Antibody IgG     Antibody Screen - Red Cell     Lead, Venous Blood Confirmation     Hepatitis C antibody     Urine Drugs of Abuse Screen Panel 1 - Drug Screen (Full)     ABO/Rh type and screen       31 year old , 13w6d weeks of pregnancy with DANIEL of 2022, by Last Menstrual Period    Discussed as follows:  Reviewed med use in pregnancy  Reviewed signs of  miscarriage  Reviewed prenatal vitamin.    Reviewed diet in pregnancy.    Reviewed when to seek care.  Reviewed weight gain in pregnancy, high risk for gestational diabetes.  Check glucose now.   Reviewed morbid obesity.      Counseling given:   - Follow up in 4-6 weeks for return OB visit.  - Recommended weight gain for pregnancy: < 15 lbs.  -needs pap smear post partum .   bmi greater than 40 prepregnancy . Needs monitoring per recommendation.          PLAN/PATIENT INSTRUCTIONS:    Influenza vaccine done today.     Vanessa Fregoso MD

## 2021-09-30 NOTE — PATIENT INSTRUCTIONS
Patient Education     Adapting to Pregnancy: First Trimester  As your body adjusts during your first trimester of pregnancy, you may have to change or limit your daily activities. You ll need more rest. You may also need to use the energy you have more wisely.   Your changing body  Almost every part of your body is affected as you adapt to pregnancy. The uterus and cervix will start to soften right away. You may not look very pregnant during the first 3 months. But you are likely to have some common signs of early pregnancy:     Nausea    Fatigue    Frequent urination    Mood swings    Bloating of the belly    Constipation    Heartburn    Missed or light periods (first trimester bleeding)    Nipple or breast tenderness and breast swelling  It s not too late to start good habits   What matters most is protecting your baby from this moment on. If you smoke, drink alcohol, or use drugs, now is the time to stop. If you need help, talk with your healthcare provider:     Smoking increases the risk of stillbirth or having a low-birth-weight baby. If you smoke, quit now.    Alcohol and drugs have been linked with miscarriage, birth defects, intellectual disability, and low birth weight. Don't drink alcohol or take drugs.  Tips to relieve nausea  During pregnancy, nausea can happen at any time of the day, but it may be worse in the morning. To help prevent nausea:     Eat small, light meals at frequent intervals.    Drink fluids often.    Get up slowly. Eat a few unsalted crackers before you get out of bed.    Avoid smells that bother you.    Avoid spicy and fatty foods.    Eat an ice pop in your favorite flavor.    Get plenty of rest.    Ask your healthcare provider about taking irais or vitamin B6 for nausea and vomiting.    Talk with your healthcare provider if you take vitamins that upset your stomach.   Work concerns  The end of the first trimester is a good time to discuss working during pregnancy with your employer.  Follow your healthcare provider s advice if your job needs you to stand for a long time, work with hazardous tools, or even sit at a desk all day. Your workspace, workload, or scheduled hours may need to be adjusted. Perhaps you can change body postures more often or take an extra break.   Advice for travel  Talk to your healthcare provider first, but the second trimester may be the best time for any travel. You may be advised to avoid certain trips while you re pregnant. Food and water can be concerns in developing countries. Travel by car is a good choice, as you can stop, get out, and stretch. Bring snacks and water along. Fasten the lap belt below your belly, low over your hips. Also be sure to wear the shoulder harness.   Intimacy  Unless your healthcare provider tells you to, there's no reason to stop having sex while you re pregnant. You or your partner may notice changes in desire. Desire may be less in the first trimester, due to nausea and fatigue. In the second trimester, sex may be very enjoyable. The third trimester can be a challenge comfort-wise. Try different positions and see what s best for you both.   Nelsy last reviewed this educational content on 4/1/2020 2000-2021 [employer]. All rights reserved. This information is not intended as a substitute for professional medical care. Always follow your healthcare professional's instructions.

## 2021-10-01 LAB
BACTERIA UR CULT: NORMAL
HBV SURFACE AG SERPL QL IA: NONREACTIVE
HCV AB SERPL QL IA: NEGATIVE
RUBV IGG SERPL QL IA: POSITIVE
T PALLIDUM AB SER QL: NEGATIVE

## 2021-10-03 LAB — LEAD BLDV-MCNC: <2 UG/DL

## 2021-10-05 LAB — SCANNED LAB RESULT: NORMAL

## 2021-11-01 ENCOUNTER — PRENATAL OFFICE VISIT (OUTPATIENT)
Dept: FAMILY MEDICINE | Facility: CLINIC | Age: 31
End: 2021-11-01
Payer: COMMERCIAL

## 2021-11-01 VITALS
HEIGHT: 63 IN | HEART RATE: 95 BPM | DIASTOLIC BLOOD PRESSURE: 70 MMHG | OXYGEN SATURATION: 98 % | WEIGHT: 239 LBS | SYSTOLIC BLOOD PRESSURE: 100 MMHG | BODY MASS INDEX: 42.35 KG/M2 | TEMPERATURE: 98.8 F | RESPIRATION RATE: 14 BRPM

## 2021-11-01 DIAGNOSIS — O09.90 HIGH-RISK PREGNANCY, UNSPECIFIED TRIMESTER: Primary | ICD-10-CM

## 2021-11-01 PROCEDURE — 99207 PR PRENATAL VISIT: CPT | Performed by: FAMILY MEDICINE

## 2021-11-01 ASSESSMENT — MIFFLIN-ST. JEOR: SCORE: 1766.84

## 2021-11-01 NOTE — PROGRESS NOTES
She is doing well.  Denies any bleeding, cramping ,or lof.  She has not felt atny movement yet.   Denies any headaches or vision changes.    Denies any lower extremity edema.    Denies any ruq pain.  Denies itching. .  She has an upcoming MRI on Wednesday.    Reviewed all normal labs.    Reviewed birth control, she does not want another baby after this baby.  Reviewed paraguard IUD as an option . She will check with heme/onc.    She has cut back a bit on carbs.  She is eating more vegetables, fruits, and proteins . She is eating normally.    US scheduled with michelle at 20 weeks.

## 2021-11-03 ENCOUNTER — HOSPITAL ENCOUNTER (OUTPATIENT)
Dept: MRI IMAGING | Facility: HOSPITAL | Age: 31
End: 2021-11-03
Attending: OBSTETRICS & GYNECOLOGY
Payer: COMMERCIAL

## 2021-11-03 DIAGNOSIS — C56.1 MALIGNANT NEOPLASM OF RIGHT OVARY (H): ICD-10-CM

## 2021-11-03 PROCEDURE — 74181 MRI ABDOMEN W/O CONTRAST: CPT

## 2021-11-03 PROCEDURE — 72195 MRI PELVIS W/O DYE: CPT

## 2021-11-11 ENCOUNTER — TRANSFERRED RECORDS (OUTPATIENT)
Dept: HEALTH INFORMATION MANAGEMENT | Facility: CLINIC | Age: 31
End: 2021-11-11
Payer: COMMERCIAL

## 2021-11-15 ENCOUNTER — TRANSFERRED RECORDS (OUTPATIENT)
Dept: HEALTH INFORMATION MANAGEMENT | Facility: CLINIC | Age: 31
End: 2021-11-15
Payer: COMMERCIAL

## 2021-12-06 ENCOUNTER — PRENATAL OFFICE VISIT (OUTPATIENT)
Dept: FAMILY MEDICINE | Facility: CLINIC | Age: 31
End: 2021-12-06
Payer: COMMERCIAL

## 2021-12-06 VITALS
HEART RATE: 94 BPM | HEIGHT: 62 IN | RESPIRATION RATE: 16 BRPM | DIASTOLIC BLOOD PRESSURE: 64 MMHG | OXYGEN SATURATION: 99 % | WEIGHT: 244.5 LBS | BODY MASS INDEX: 44.99 KG/M2 | SYSTOLIC BLOOD PRESSURE: 100 MMHG | TEMPERATURE: 98.2 F

## 2021-12-06 DIAGNOSIS — O09.90 HIGH-RISK PREGNANCY, UNSPECIFIED TRIMESTER: Primary | ICD-10-CM

## 2021-12-06 PROCEDURE — 99207 PR PRENATAL VISIT: CPT | Performed by: FAMILY MEDICINE

## 2021-12-06 ASSESSMENT — MIFFLIN-ST. JEOR: SCORE: 1782.29

## 2021-12-06 NOTE — PROGRESS NOTES
She is doing well.  Denies any bleeding, cramping ,or lof.  Baby is moving well.    Denies any headaches or vision changes.    Denies any lower extremity edema.    Denies any ruq pain.  Denies itching.   Reviewed ob ultrasound.    She is eating well, though not large amounts .  Reviewed covid booster in January.    Needs 1 hour glucose at next visit.

## 2022-01-07 ENCOUNTER — PRENATAL OFFICE VISIT (OUTPATIENT)
Dept: FAMILY MEDICINE | Facility: CLINIC | Age: 32
End: 2022-01-07
Payer: COMMERCIAL

## 2022-01-07 VITALS
DIASTOLIC BLOOD PRESSURE: 68 MMHG | WEIGHT: 243.75 LBS | BODY MASS INDEX: 44.85 KG/M2 | OXYGEN SATURATION: 99 % | HEIGHT: 62 IN | RESPIRATION RATE: 20 BRPM | TEMPERATURE: 98.7 F | SYSTOLIC BLOOD PRESSURE: 100 MMHG | HEART RATE: 86 BPM

## 2022-01-07 DIAGNOSIS — E66.813 CLASS 3 OBESITY: ICD-10-CM

## 2022-01-07 DIAGNOSIS — K76.0 FATTY LIVER: ICD-10-CM

## 2022-01-07 DIAGNOSIS — O09.90 HIGH-RISK PREGNANCY, UNSPECIFIED TRIMESTER: Primary | ICD-10-CM

## 2022-01-07 LAB
ALBUMIN SERPL-MCNC: 2.9 G/DL (ref 3.5–5)
ALBUMIN UR-MCNC: NEGATIVE MG/DL
ALP SERPL-CCNC: 72 U/L (ref 45–120)
ALT SERPL W P-5'-P-CCNC: 15 U/L (ref 0–45)
ANION GAP SERPL CALCULATED.3IONS-SCNC: 12 MMOL/L (ref 5–18)
ANTIBODY SCREEN: NEGATIVE
APPEARANCE UR: ABNORMAL
AST SERPL W P-5'-P-CCNC: 14 U/L (ref 0–40)
BACTERIA #/AREA URNS HPF: ABNORMAL /HPF
BILIRUB SERPL-MCNC: 0.7 MG/DL (ref 0–1)
BILIRUB UR QL STRIP: NEGATIVE
BUN SERPL-MCNC: 7 MG/DL (ref 8–22)
CALCIUM SERPL-MCNC: 9 MG/DL (ref 8.5–10.5)
CHLORIDE BLD-SCNC: 106 MMOL/L (ref 98–107)
CO2 SERPL-SCNC: 18 MMOL/L (ref 22–31)
COLOR UR AUTO: YELLOW
CREAT SERPL-MCNC: 0.59 MG/DL (ref 0.6–1.1)
GFR SERPL CREATININE-BSD FRML MDRD: >90 ML/MIN/1.73M2
GLUCOSE 1H P 50 G GLC PO SERPL-MCNC: 145 MG/DL (ref 70–129)
GLUCOSE BLD-MCNC: 158 MG/DL (ref 70–125)
GLUCOSE UR STRIP-MCNC: NEGATIVE MG/DL
HGB BLD-MCNC: 11 G/DL (ref 11.7–15.7)
HGB UR QL STRIP: NEGATIVE
KETONES UR STRIP-MCNC: NEGATIVE MG/DL
LEUKOCYTE ESTERASE UR QL STRIP: ABNORMAL
MUCOUS THREADS #/AREA URNS LPF: PRESENT /LPF
NITRATE UR QL: NEGATIVE
PH UR STRIP: 5.5 [PH] (ref 5–7)
POTASSIUM BLD-SCNC: 3.2 MMOL/L (ref 3.5–5)
PROT SERPL-MCNC: 6.5 G/DL (ref 6–8)
RBC #/AREA URNS AUTO: ABNORMAL /HPF
SODIUM SERPL-SCNC: 136 MMOL/L (ref 136–145)
SP GR UR STRIP: 1.02 (ref 1–1.03)
SPECIMEN EXPIRATION DATE: NORMAL
SQUAMOUS #/AREA URNS AUTO: ABNORMAL /LPF
T PALLIDUM AB SER QL: NONREACTIVE
UROBILINOGEN UR STRIP-ACNC: 0.2 E.U./DL
WBC #/AREA URNS AUTO: ABNORMAL /HPF

## 2022-01-07 PROCEDURE — 80053 COMPREHEN METABOLIC PANEL: CPT | Performed by: FAMILY MEDICINE

## 2022-01-07 PROCEDURE — 87086 URINE CULTURE/COLONY COUNT: CPT | Performed by: FAMILY MEDICINE

## 2022-01-07 PROCEDURE — 81001 URINALYSIS AUTO W/SCOPE: CPT | Performed by: FAMILY MEDICINE

## 2022-01-07 PROCEDURE — 86850 RBC ANTIBODY SCREEN: CPT | Performed by: FAMILY MEDICINE

## 2022-01-07 PROCEDURE — 99207 PR PRENATAL VISIT: CPT | Performed by: FAMILY MEDICINE

## 2022-01-07 PROCEDURE — 82950 GLUCOSE TEST: CPT | Performed by: FAMILY MEDICINE

## 2022-01-07 PROCEDURE — 85018 HEMOGLOBIN: CPT | Performed by: FAMILY MEDICINE

## 2022-01-07 PROCEDURE — 90471 IMMUNIZATION ADMIN: CPT | Performed by: FAMILY MEDICINE

## 2022-01-07 PROCEDURE — 86780 TREPONEMA PALLIDUM: CPT | Performed by: FAMILY MEDICINE

## 2022-01-07 PROCEDURE — 36415 COLL VENOUS BLD VENIPUNCTURE: CPT | Performed by: FAMILY MEDICINE

## 2022-01-07 PROCEDURE — 90715 TDAP VACCINE 7 YRS/> IM: CPT | Performed by: FAMILY MEDICINE

## 2022-01-07 ASSESSMENT — MIFFLIN-ST. JEOR: SCORE: 1778.89

## 2022-01-07 NOTE — PROGRESS NOTES
She is doing well.  Denies any bleeding, cramping ,or lof.  Baby is moving well.    Denies any headaches or vision changes.    Denies any lower extremity edema.    Denies any ruq pain.  Denies itching.  She is eating normally . She is eating small amounts, because she is eating less at one time, she has lost weight.  1 hour testing done today.    Will look at cmp due to narayanan.   Plan for weekly bpp with nst starting at 36 weeks. Due to obesity, hepatic steatosis.    tdap done today.

## 2022-01-08 LAB — BACTERIA UR CULT: NORMAL

## 2022-01-10 ENCOUNTER — TELEPHONE (OUTPATIENT)
Dept: FAMILY MEDICINE | Facility: CLINIC | Age: 32
End: 2022-01-10
Payer: COMMERCIAL

## 2022-01-10 NOTE — TELEPHONE ENCOUNTER
Called pt and left VM about lab results.  Pt needs to come in this week for a 3 hr GTT.  Please assist to schedule when calls.  1 hr GTT done on Friday was 145.  Thanks        ----- Message from Vanessa Fregoso MD sent at 1/7/2022  1:01 PM CST -----  Team - please call patient with results   please call patient with results and let her know that she did not pass her 3-hour blood sugar test.  As we discussed in clinic, she will need to return later on this week fasting in the a.m. for a 3-hour blood sugar test.  She can have water but nothing else to eat or drink prior to coming in.

## 2022-01-14 ENCOUNTER — LAB (OUTPATIENT)
Dept: LAB | Facility: CLINIC | Age: 32
End: 2022-01-14
Payer: COMMERCIAL

## 2022-01-14 DIAGNOSIS — E66.813 CLASS 3 OBESITY: ICD-10-CM

## 2022-01-14 DIAGNOSIS — O09.90 HIGH-RISK PREGNANCY, UNSPECIFIED TRIMESTER: ICD-10-CM

## 2022-01-14 LAB
ANTIBODY SCREEN: NEGATIVE
GESTATIONAL GTT 1 HR POST DOSE: 127 MG/DL (ref 60–179)
GESTATIONAL GTT 2 HR POST DOSE: 174 MG/DL (ref 60–154)
GESTATIONAL GTT 3 HR POST DOSE: 125 MG/DL (ref 60–139)
GLUCOSE P FAST SERPL-MCNC: 69 MG/DL (ref 60–94)
SPECIMEN EXPIRATION DATE: NORMAL

## 2022-01-14 PROCEDURE — 82951 GLUCOSE TOLERANCE TEST (GTT): CPT

## 2022-01-14 PROCEDURE — 36415 COLL VENOUS BLD VENIPUNCTURE: CPT

## 2022-01-14 PROCEDURE — 82952 GTT-ADDED SAMPLES: CPT

## 2022-01-14 PROCEDURE — 86850 RBC ANTIBODY SCREEN: CPT

## 2022-01-21 ENCOUNTER — PRENATAL OFFICE VISIT (OUTPATIENT)
Dept: FAMILY MEDICINE | Facility: CLINIC | Age: 32
End: 2022-01-21
Payer: COMMERCIAL

## 2022-01-21 VITALS
WEIGHT: 242 LBS | HEART RATE: 88 BPM | BODY MASS INDEX: 44.53 KG/M2 | OXYGEN SATURATION: 98 % | DIASTOLIC BLOOD PRESSURE: 64 MMHG | SYSTOLIC BLOOD PRESSURE: 106 MMHG | HEIGHT: 62 IN | TEMPERATURE: 98 F

## 2022-01-21 DIAGNOSIS — E66.813 CLASS 3 OBESITY: ICD-10-CM

## 2022-01-21 DIAGNOSIS — O09.90 HIGH-RISK PREGNANCY, UNSPECIFIED TRIMESTER: Primary | ICD-10-CM

## 2022-01-21 PROCEDURE — 99207 PR PRENATAL VISIT: CPT | Performed by: FAMILY MEDICINE

## 2022-01-21 ASSESSMENT — MIFFLIN-ST. JEOR: SCORE: 1770.95

## 2022-01-21 NOTE — PROGRESS NOTES
She is eating when she is hungry.  She has cut out most rice and replaced it with oatmeal.    Reviewed diabetes labs.  Encouraged increased activity.  Reviewed how this can help with fatty liver.    She is doing well.  Denies any bleeding, cramping ,or lof.  Baby is moving well.    Denies any headaches or vision changes.    Denies any lower extremity edema.    Denies any ruq pain.  Denies itching. .  She has leaking of urine, which is bothersome  She is sure it is urine.   Wants covid booster at next visit.

## 2022-01-25 ENCOUNTER — TRANSFERRED RECORDS (OUTPATIENT)
Dept: HEALTH INFORMATION MANAGEMENT | Facility: CLINIC | Age: 32
End: 2022-01-25
Payer: COMMERCIAL

## 2022-01-25 LAB
ALT SERPL-CCNC: 17 IU/L (ref 0–32)
AST SERPL-CCNC: 13 IU/L (ref 0–40)

## 2022-02-01 ENCOUNTER — TRANSFERRED RECORDS (OUTPATIENT)
Dept: HEALTH INFORMATION MANAGEMENT | Facility: CLINIC | Age: 32
End: 2022-02-01
Payer: COMMERCIAL

## 2022-02-01 LAB
ALT SERPL-CCNC: 16 IU/L (ref 0–32)
AST SERPL-CCNC: 18 IU/L (ref 0–40)

## 2022-02-04 ENCOUNTER — PRENATAL OFFICE VISIT (OUTPATIENT)
Dept: FAMILY MEDICINE | Facility: CLINIC | Age: 32
End: 2022-02-04
Payer: COMMERCIAL

## 2022-02-04 VITALS
WEIGHT: 245 LBS | HEART RATE: 78 BPM | TEMPERATURE: 98.4 F | SYSTOLIC BLOOD PRESSURE: 108 MMHG | DIASTOLIC BLOOD PRESSURE: 64 MMHG | HEIGHT: 62 IN | BODY MASS INDEX: 45.08 KG/M2 | OXYGEN SATURATION: 99 %

## 2022-02-04 DIAGNOSIS — E66.813 CLASS 3 OBESITY: ICD-10-CM

## 2022-02-04 DIAGNOSIS — O09.90 HIGH-RISK PREGNANCY, UNSPECIFIED TRIMESTER: Primary | ICD-10-CM

## 2022-02-04 DIAGNOSIS — K76.0 FATTY LIVER: ICD-10-CM

## 2022-02-04 PROCEDURE — 99207 PR PRENATAL VISIT: CPT | Performed by: FAMILY MEDICINE

## 2022-02-04 ASSESSMENT — MIFFLIN-ST. JEOR: SCORE: 1784.56

## 2022-02-04 NOTE — PROGRESS NOTES
She is doing well.  Denies any bleeding, lof.  Had some cramping last night.  Not associated with any activity.  It happened when she was trying to go to sleep.  It felt like menstrual type cramps, but it has resolved.     Baby is moving well.    Denies any headaches or vision changes.    Denies any lower extremity edema.    Denies any ruq pain.  Denies itching. .  Reviewed gi note.    Had some light pink discharge a few weeks ago.  Nothing since.  It did not follow sex.  Advised to let us know if it happens again.    Needs lft's at the end of this month.    Needs weekly bpp and nst starting at 36 weeks.

## 2022-02-11 ENCOUNTER — MEDICAL CORRESPONDENCE (OUTPATIENT)
Dept: HEALTH INFORMATION MANAGEMENT | Facility: CLINIC | Age: 32
End: 2022-02-11
Payer: COMMERCIAL

## 2022-02-11 ENCOUNTER — TRANSFERRED RECORDS (OUTPATIENT)
Dept: HEALTH INFORMATION MANAGEMENT | Facility: CLINIC | Age: 32
End: 2022-02-11
Payer: COMMERCIAL

## 2022-02-18 ENCOUNTER — PRENATAL OFFICE VISIT (OUTPATIENT)
Dept: FAMILY MEDICINE | Facility: CLINIC | Age: 32
End: 2022-02-18
Payer: COMMERCIAL

## 2022-02-18 VITALS
RESPIRATION RATE: 20 BRPM | OXYGEN SATURATION: 98 % | BODY MASS INDEX: 45.27 KG/M2 | TEMPERATURE: 98.1 F | SYSTOLIC BLOOD PRESSURE: 100 MMHG | WEIGHT: 246 LBS | DIASTOLIC BLOOD PRESSURE: 60 MMHG | HEIGHT: 62 IN | HEART RATE: 76 BPM

## 2022-02-18 DIAGNOSIS — O09.90 SUPERVISION OF HIGH RISK PREGNANCY, ANTEPARTUM: Primary | ICD-10-CM

## 2022-02-18 PROBLEM — D39.10 NEOPLASM OF UNCERTAIN BEHAVIOR OF OVARY: Status: ACTIVE | Noted: 2022-02-18

## 2022-02-18 PROCEDURE — 99207 PR PRENATAL VISIT: CPT | Performed by: FAMILY MEDICINE

## 2022-02-18 NOTE — PROGRESS NOTES
"Assessment/Plan:   31 year old  at 34w0d    Supervision of high risk pregnancy, antepartum  Doing well.  Multiple medical issues are stable.  Discussed pre-eclampsia warning s/sx.  Next visit:  GBS, start weekly monitoring       Return in 2 weeks (on 3/4/2022) for prenatal care.  Subjective:   Mahesh Christie is a 31 year old  at 34w0d who is seen for routine prenatal care.   Doing well.  Endorses normal fetal movement.  Denies headache, abdominal pain, nausea, contractions, lower extremity edema.  Does have pelvic pressure and external genitalia fullness (diffuse).  Objective   Vitals: /60   Pulse 76   Temp 98.1  F (36.7  C) (Oral)   Resp 20   Ht 1.575 m (5' 2\")   Wt 111.6 kg (246 lb)   LMP 2021 (Exact Date)   SpO2 98%   BMI 44.99 kg/m     Exam: Per flowsheet     "

## 2022-02-18 NOTE — PATIENT INSTRUCTIONS
Patient Education     Gestational Hypertension  What is gestational hypertension?  Gestational hypertension is high blood pressure in pregnancy. It occurs in about 3 in 50 pregnancies.    This condition is different from chronic hypertension. Chronic hypertension happens when a woman has high blood pressure before she gets pregnant. It s also different from preeclampsia and eclampsia. These are other blood pressure problems in pregnancy.     Gestational hypertension often starts in the second half of pregnancy. It normally goes away after your baby is born.    What causes gestational hypertension?  Healthcare providers don't know what causes this condition. The following things may increase your risk:     Having high blood pressure before pregnancy or with a past pregnancy    Having kidney disease    Having diabetes    Being younger than 20 years of age or older than 40 years of age    Being pregnant with multiples, such as twins or triplets    Being   What are the symptoms of gestational hypertension?  Symptoms can occur a bit differently in each pregnancy.   The main symptom is high blood pressure in the second half of pregnancy. But some women don t have any symptoms.   High blood pressure in pregnancy can lead to other serious issues. These can include preeclampsia. You should watch for signs of high blood pressure. They can include:     Headache that doesn t go away    Edema (swelling)    Sudden weight gain    Vision changes, such as blurred or double vision    Nausea or vomiting    Pain in the upper right side of your belly, or pain around your stomach    Making small amounts of urine  How is gestational hypertension diagnosed?  If your blood pressure increases, your healthcare provider may diagnose you with this condition. You may also have the following tests to check for this issue:     Blood pressure readings    Urine testing to check for protein, which is a sign that your kidneys aren t  working well    Checking for swelling    Checking your weight more often    Liver and kidney function tests    Blood clotting tests  How is gestational hypertension treated?  Blood pressure monitoring  Your healthcare provider may check your blood pressure more often. You should also tell your healthcare provider if you have any new symptoms.   Fetal monitoring  Your healthcare provider may do tests to check the health of your baby. These tests may include:     Fetal movement counting. You ll keep track of your baby s kicks and movements. A change in the number of kicks or how often your baby kicks may mean your baby is under stress.    Nonstress testing. This test measures your baby s heart rate in response to its movements.    Biophysical profile. This test combines a nonstress test with an ultrasound to watch your baby.    Doppler flow studies. This test is a type of ultrasound that uses sound waves to measure the flow of your baby s blood through a blood vessel.  Lab testing  Your healthcare provider may test your urine and blood at every prenatal checkup. This testing will tell if your condition is getting worse.   Medicine  Your healthcare provider may give you corticosteroids. These medicines can help your baby s lungs mature. You ll get these medicines if it looks like your baby is going to be born early.     What are possible complications of gestational hypertension?   High blood pressure can affect your blood vessels. It may decrease blood flow in your liver, kidneys, brain, uterus, and placenta.    This condition can get worse. It can lead to preeclampsia and eclampsia. These are serious blood pressure problems. These issues can cause the following problems:     Placental abruption, when the placenta pulls away from the uterus too early    Poor fetal growth (intrauterine growth restriction)    Stillbirth    Seizures (eclampsia)    Death of the mother and baby  Because of these risks, your healthcare  provider may decide that you need to have your baby early. This may happen before 37 weeks of pregnancy.   Even if your blood pressure goes back to normal after childbirth, you have a higher chance of having high blood pressure in the future.   Can gestational hypertension be prevented?  Having this issue diagnosed and treated early may help reduce your risk for complications. That's why it s important to go to your prenatal checkups. Doing so may keep your condition from getting worse.   When should I call my healthcare provider?  Call your healthcare provider right away if you have signs of high blood pressure. Symptoms can include a headache that doesn t go away, blurred or double vision, swelling, or making less urine than normal.     Key points about gestational hypertension    Gestational hypertension is a form of high blood pressure in pregnancy. It occurs in about 3 in 50 pregnancies.    This condition can affect the health of both the mother and the baby, depending on how severe the issue is.    Call your healthcare provider right away if you have signs of high blood pressure. Symptoms can include a headache that doesn t go away, blurred or double vision, swelling, or making less urine than normal.      The goal of treatment is to prevent the condition from getting worse and causing other problems.    Next steps  Tips to help you get the most from a visit to your healthcare provider:     Know the reason for your visit and what you want to happen.    Before your visit, write down questions you want answered.    Bring someone with you to help you ask questions and remember what your provider tells you.    At the visit, write down the name of a new diagnosis and any new medicines, treatments, or tests. Also write down any new instructions your provider gives you.    Know why a new medicine or treatment is prescribed and how it will help you. Also know what the side effects are.    Ask if your condition can be  treated in other ways.    Know why a test or procedure is recommended and what the results could mean.    Know what to expect if you do not take the medicine or have the test or procedure.    If you have a follow-up appointment, write down the date, time, and purpose for that visit.    Know how you can contact your provider if you have questions.  Nelsy last reviewed this educational content on 12/1/2020 2000-2021 [employer]. All rights reserved. This information is not intended as a substitute for professional medical care. Always follow your healthcare professional's instructions.

## 2022-02-21 ENCOUNTER — HOSPITAL ENCOUNTER (OUTPATIENT)
Facility: HOSPITAL | Age: 32
End: 2022-02-21
Admitting: FAMILY MEDICINE
Payer: COMMERCIAL

## 2022-02-21 ENCOUNTER — HOSPITAL ENCOUNTER (OUTPATIENT)
Facility: HOSPITAL | Age: 32
Discharge: HOME | End: 2022-02-21
Attending: FAMILY MEDICINE | Admitting: FAMILY MEDICINE
Payer: COMMERCIAL

## 2022-02-21 ENCOUNTER — NURSE TRIAGE (OUTPATIENT)
Dept: NURSING | Facility: CLINIC | Age: 32
End: 2022-02-21
Payer: COMMERCIAL

## 2022-02-21 VITALS
DIASTOLIC BLOOD PRESSURE: 83 MMHG | BODY MASS INDEX: 45.27 KG/M2 | SYSTOLIC BLOOD PRESSURE: 125 MMHG | HEIGHT: 62 IN | RESPIRATION RATE: 18 BRPM | WEIGHT: 246 LBS | HEART RATE: 95 BPM | TEMPERATURE: 98.5 F

## 2022-02-21 DIAGNOSIS — B37.31 YEAST INFECTION OF THE VAGINA: Primary | ICD-10-CM

## 2022-02-21 PROBLEM — Z36.89 ENCOUNTER FOR TRIAGE IN PREGNANT PATIENT: Status: ACTIVE | Noted: 2022-02-21

## 2022-02-21 LAB
ALBUMIN UR-MCNC: NEGATIVE MG/DL
APPEARANCE UR: ABNORMAL
BACTERIA #/AREA URNS HPF: ABNORMAL /HPF
BILIRUB UR QL STRIP: NEGATIVE
CLUE CELLS: ABNORMAL
COLOR UR AUTO: ABNORMAL
GLUCOSE UR STRIP-MCNC: NEGATIVE MG/DL
HGB UR QL STRIP: NEGATIVE
KETONES UR STRIP-MCNC: NEGATIVE MG/DL
LEUKOCYTE ESTERASE UR QL STRIP: ABNORMAL
MUCOUS THREADS #/AREA URNS LPF: PRESENT /LPF
NITRATE UR QL: NEGATIVE
PH UR STRIP: 5.5 [PH] (ref 5–7)
RBC URINE: 1 /HPF
RUPTURE OF FETAL MEMBRANES BY ROM PLUS: NEGATIVE
SP GR UR STRIP: 1.02 (ref 1–1.03)
SQUAMOUS EPITHELIAL: 11 /HPF
TRICHOMONAS, WET PREP: ABNORMAL
UROBILINOGEN UR STRIP-MCNC: <2 MG/DL
WBC URINE: 38 /HPF
WBC'S/HIGH POWER FIELD, WET PREP: ABNORMAL
YEAST, WET PREP: PRESENT

## 2022-02-21 PROCEDURE — 87086 URINE CULTURE/COLONY COUNT: CPT | Performed by: FAMILY MEDICINE

## 2022-02-21 PROCEDURE — 87210 SMEAR WET MOUNT SALINE/INK: CPT | Performed by: FAMILY MEDICINE

## 2022-02-21 PROCEDURE — 81001 URINALYSIS AUTO W/SCOPE: CPT | Performed by: FAMILY MEDICINE

## 2022-02-21 PROCEDURE — 84112 EVAL AMNIOTIC FLUID PROTEIN: CPT | Performed by: FAMILY MEDICINE

## 2022-02-21 RX ORDER — MICONAZOLE NITRATE 2 %
1 CREAM WITH APPLICATOR VAGINAL AT BEDTIME
Status: DISCONTINUED | OUTPATIENT
Start: 2022-02-21 | End: 2022-02-21 | Stop reason: HOSPADM

## 2022-02-21 RX ORDER — LIDOCAINE 40 MG/G
CREAM TOPICAL
Status: DISCONTINUED | OUTPATIENT
Start: 2022-02-21 | End: 2022-02-21 | Stop reason: HOSPADM

## 2022-02-21 RX ORDER — MICONAZOLE NITRATE 2 %
1 CREAM WITH APPLICATOR VAGINAL AT BEDTIME
Qty: 45 G | Refills: 0 | Status: SHIPPED | OUTPATIENT
Start: 2022-02-21 | End: 2022-02-28

## 2022-02-21 NOTE — TELEPHONE ENCOUNTER
Sharp pain in vagina it bothers her lift leg to walk. It  Does get better after she takes a few steps.  She rates the pain a 6/10 it started this morning.  She denies any bleeding or fluid leeking.    Per protocol send to L&D for evaluation.  Report given to Adrianne at [facility] L[facility]D.Care advice given.  Verbalizes understanding and agrees with plan.    Annette Amin RN   [facility] Nurse Advisor  8:43 AM 2/21/2022    COVID 19 Nurse Triage Plan/Patient Instructions    Please be aware that novel coronavirus (COVID-19) may be circulating in the community. If you develop symptoms such as fever, cough, or SOB or if you have concerns about the presence of another infection including coronavirus (COVID-19), please contact your health care provider or visit [email or web address].     Disposition/Instructions    In-Person Visit with provider recommended. Reference Visit Selection Guide.    Thank you for taking steps to prevent the spread of this virus.  o Limit your contact with others.  o Wear a simple mask to cover your cough.  o Wash your hands well and often.    Resources    [facility]: About COVID-19: [email or web address]    CDC: What to Do If You're Sick: www.cdc.gov/coronavirus/2019-ncov/about/steps-when-sick.html    CDC: Ending Home Isolation: www.cdc.gov/coronavirus/2019-ncov/hcp/disposition-in-home-patients.html     CDC: Caring for Someone: www.cdc.gov/coronavirus/2019-ncov/if-you-are-sick/care-for-someone.html     [facility]: Interim Guidance for Hospital Discharge to Home: www.health.[facility].[place]/diseases/coronavirus/hcp/hospdischarge.pdf    [facility] clinical trials (COVID-19 research studies): [email or web address]/umn-clinical-trials     Below are the COVID-19 hotlines at the [place] Department of Health ([facility]). Interpreters are available.   o For health questions: Call 624-688-7692 or 1-315.690.2391 (7 a.m. to 7 p.m.)  o For questions about schools and childcare:  Call 953-067-5008 or 1-468.589.7892 (7 a.m. to 7 p.m.)     Reason for Disposition    MODERATE-SEVERE abdominal pain (e.g., interferes with normal activities, awakens from sleep)    Additional Information    Negative: Passed out (i.e., fainted, collapsed and was not responding)    Negative: Shock suspected (e.g., cold/pale/clammy skin, too weak to stand, low BP, rapid pulse)    Negative: Difficult to awaken or acting confused (e.g., disoriented, slurred speech)    Negative: SEVERE abdominal pain (e.g., excruciating), constant, and present > 1 hour    Negative: SEVERE vaginal bleeding (e.g., continuous red blood from vagina, large blood clots)    Negative: Sounds like a life-threatening emergency to the triager    Negative: Having contractions or other symptoms of labor (such as vaginal pressure) and > 36 weeks pregnant (i.e., term pregnancy)    Negative: Having contractions or other symptoms of labor (such as vaginal pressure) and < 37 weeks pregnant (i.e., )    Negative: Abdominal pain and pregnant < 20 weeks    Negative: Vomiting red blood or black (coffee ground) material    Protocols used: PREGNANCY - ABDOMINAL PAIN GREATER THAN 20 WEEKS EGA-A-[place]

## 2022-02-21 NOTE — PROVIDER NOTIFICATION
02/21/22 0955   Provider Notification   Provider Name/Title Dr. Valdez   Method of Notification In Department   Request Evaluate - Remote   Notification Reason Patient Arrived;Pain;Patient Request     Notified Dr. Valdez of patient arrival. Complaints of sharp vaginal pain when rising and sore when walking. Positive fetal movement. Some leaking of fluid when moving. No contractions.     History of Ovarian CA and hepatic steatosis    Orders for UA, wetprep, Rom plus. Regular Diet    Notify Dr. Valdez with results

## 2022-02-21 NOTE — PROGRESS NOTES
See is here for vaginal pain. She states it's sharp when she gets up from a sitting or lying position and sore walking. See states positive fetal movement, some leaking of fluid - she thinks it's her bladder. She is unsure of the color, maybe yellow. See states no contractions.  efm category I, no contractions on the monitor.  Voice mail left for Dr. Fregoso to call back

## 2022-02-21 NOTE — PROGRESS NOTES
Late Entry:    Discussed discharge instructions with See. Stated that Dr. Valdez will send her prescription for the yeast infection to the pharmacy. See stated understanding and signed for discharge. Follow up care as scheduled with Dr. Fregoso.

## 2022-02-21 NOTE — DISCHARGE INSTRUCTIONS
Discharge Instruction for Undelivered Patients      You were seen for: pain  We Consulted: Courtney  You had a wet prep      Call your provider if you notice:  Swelling in your face or increased swelling in your hands or legs.  Headaches that are not relieved by Tylenol (acetaminophen).  Changes in your vision (blurring: seeing spots or stars.)  Nausea (sick to your stomach) and vomiting (throwing up).   Weight gain of 5 pounds or more per week.  Heartburn that doesn't go away.  Signs of bladder infection: pain when you urinate (use the toilet), need to go more often and more urgently.  The bag of park (rupture of membranes) breaks, or you notice leaking in your underwear.  Bright red blood in your underwear.  Abdominal (lower belly) or stomach pain.  For first baby: Contractions (tightening) less than 5 minutes apart for one hour or more.  Second (plus) baby: Contractions (tightening) less than 10 minutes apart and getting stronger.  *If less than 34 weeks: Contractions (tightening) more than 6 times in one hour.  Increase or change in vaginal discharge (note the color and amount)  Other: ***    Follow-up:  With your primary provider as scheduled

## 2022-02-22 LAB — BACTERIA UR CULT: NORMAL

## 2022-03-04 ENCOUNTER — PRENATAL OFFICE VISIT (OUTPATIENT)
Dept: FAMILY MEDICINE | Facility: CLINIC | Age: 32
End: 2022-03-04
Payer: COMMERCIAL

## 2022-03-04 VITALS
DIASTOLIC BLOOD PRESSURE: 70 MMHG | HEIGHT: 62 IN | SYSTOLIC BLOOD PRESSURE: 112 MMHG | HEART RATE: 80 BPM | RESPIRATION RATE: 18 BRPM | WEIGHT: 250 LBS | BODY MASS INDEX: 46.01 KG/M2 | TEMPERATURE: 98.5 F

## 2022-03-04 DIAGNOSIS — K76.0 FATTY LIVER: ICD-10-CM

## 2022-03-04 DIAGNOSIS — O09.90 SUPERVISION OF HIGH RISK PREGNANCY, ANTEPARTUM: Primary | ICD-10-CM

## 2022-03-04 DIAGNOSIS — E66.813 CLASS 3 OBESITY: ICD-10-CM

## 2022-03-04 PROCEDURE — 87653 STREP B DNA AMP PROBE: CPT | Performed by: FAMILY MEDICINE

## 2022-03-04 PROCEDURE — 99207 PR PRENATAL VISIT: CPT | Performed by: FAMILY MEDICINE

## 2022-03-04 NOTE — PROGRESS NOTES
"ASSESSMENT/PLAN:   See was seen today for prenatal care.    Diagnoses and all orders for this visit:    Supervision of high risk pregnancy, antepartum  -     Maternal Fetal BPP Single; Standing  -     FETAL NON-STRESS TEST; Standing  -     Group B strep PCR    Fatty liver  -     Maternal Fetal BPP Single; Standing  -     FETAL NON-STRESS TEST; Standing    Class 3 obesity  -     Maternal Fetal BPP Single; Standing  -     FETAL NON-STRESS TEST; Standing      Weekly bpp with nst ordered . I reviewed this with the patient.    Consider induction at 39 weeks.    Reviewed to walk daily, watch food intake, eat healthy diet.   gbs done today.    Reviewed signs of labor.     No follow-ups on file.       ======================================================    SUBJECTIVE  See Pratik is a 31 year old female here for   1.  Routine prenatal  She is doing well.  Denies any bleeding, cramping ,or lof.  Baby is moving well.    Denies any headaches or vision changes.    Denies any lower extremity edema.    Denies any ruq pain.  Denies itching. .  It is getting harder to walk due to round ligament pain.        ROS  Complete 10 point review of systems negative except as noted above in HPI      OBJECTIVE  /70   Pulse 80   Temp 98.5  F (36.9  C) (Oral)   Resp 18   Ht 1.575 m (5' 2\")   Wt 113.4 kg (250 lb)   LMP 06/25/2021 (Exact Date)   BMI 45.73 kg/m     Gen:  Nad, alert    Current Outpatient Medications   Medication     acetaminophen (TYLENOL) 325 MG tablet     loratadine (CLARITIN) 10 MG tablet     Prenatal MV-Min-Fe Fum-FA-DHA (PRENATAL MULTIVITAMIN PLUS DHA) 27-0.8-250 MG CAPS     vitamin D3 (CHOLECALCIFEROL) 10 MCG (400 UNIT) capsule     No current facility-administered medications for this visit.      Patient Active Problem List   Diagnosis     Morbid obesity (H)     Postpartum hemorrhage of vagina     Anemia     Complex ovarian cyst     S/P laparoscopy     Granulosa cell tumor     Fatty liver     Cardiomegaly     " Elevated LFTs     Neoplasm of uncertain behavior of ovary     Encounter for triage in pregnant patient        LABS & IMAGES   No results found for any visits on 03/04/22.      ======================================================    MDM          Options for treatment and follow-up care were reviewed with the patient. Mahesh Christie and/or guardian was engaged and actively involved in the decision making process. Mahesh Christie and/or guardian verbalized understanding of the options discussed and was satisfied with the final plan.      Vanessa Fregoso MD

## 2022-03-05 LAB — GP B STREP DNA SPEC QL NAA+PROBE: POSITIVE

## 2022-03-11 ENCOUNTER — PRENATAL OFFICE VISIT (OUTPATIENT)
Dept: FAMILY MEDICINE | Facility: CLINIC | Age: 32
End: 2022-03-11
Payer: COMMERCIAL

## 2022-03-11 VITALS
DIASTOLIC BLOOD PRESSURE: 82 MMHG | SYSTOLIC BLOOD PRESSURE: 118 MMHG | RESPIRATION RATE: 16 BRPM | HEART RATE: 83 BPM | WEIGHT: 250.5 LBS | BODY MASS INDEX: 45.82 KG/M2 | TEMPERATURE: 98.6 F | OXYGEN SATURATION: 9 %

## 2022-03-11 DIAGNOSIS — Z12.4 CERVICAL CANCER SCREENING: Primary | ICD-10-CM

## 2022-03-11 DIAGNOSIS — O09.90 SUPERVISION OF HIGH RISK PREGNANCY, ANTEPARTUM: ICD-10-CM

## 2022-03-11 PROCEDURE — 99207 PR PRENATAL VISIT: CPT | Performed by: FAMILY MEDICINE

## 2022-03-11 NOTE — PROGRESS NOTES
.She is doing well.  Denies any bleeding, cramping ,or lof.  Baby is moving well.    Denies any headaches or vision changes.    Denies any lower extremity edema.    Denies any ruq pain.  Denies itching. .  No one called her for her bpp with nst to set this up.  Will have it done in clinic today.    Needs weekly bpp with nst at the hospital  Monitor bp.    Reviewed GBS positive, tx, and 48 hour stay.

## 2022-03-15 ENCOUNTER — TRANSFERRED RECORDS (OUTPATIENT)
Dept: HEALTH INFORMATION MANAGEMENT | Facility: CLINIC | Age: 32
End: 2022-03-15
Payer: COMMERCIAL

## 2022-03-18 ENCOUNTER — PRENATAL OFFICE VISIT (OUTPATIENT)
Dept: FAMILY MEDICINE | Facility: CLINIC | Age: 32
End: 2022-03-18
Payer: COMMERCIAL

## 2022-03-18 VITALS
SYSTOLIC BLOOD PRESSURE: 122 MMHG | RESPIRATION RATE: 18 BRPM | BODY MASS INDEX: 46.14 KG/M2 | OXYGEN SATURATION: 98 % | DIASTOLIC BLOOD PRESSURE: 78 MMHG | WEIGHT: 252.25 LBS | TEMPERATURE: 98.8 F | HEART RATE: 98 BPM

## 2022-03-18 DIAGNOSIS — K76.0 FATTY LIVER: ICD-10-CM

## 2022-03-18 DIAGNOSIS — O09.43 SUPERVISION OF HIGH-RISK PREGNANCY WITH GRAND MULTIPARITY IN THIRD TRIMESTER: Primary | ICD-10-CM

## 2022-03-18 DIAGNOSIS — E66.01 MORBID OBESITY (H): ICD-10-CM

## 2022-03-18 LAB
ALBUMIN SERPL-MCNC: 2.8 G/DL (ref 3.5–5)
ALP SERPL-CCNC: 220 U/L (ref 45–120)
ALT SERPL W P-5'-P-CCNC: 9 U/L (ref 0–45)
AST SERPL W P-5'-P-CCNC: 12 U/L (ref 0–40)
BILIRUB DIRECT SERPL-MCNC: 0.2 MG/DL
BILIRUB SERPL-MCNC: 0.8 MG/DL (ref 0–1)
PROT SERPL-MCNC: 6.8 G/DL (ref 6–8)

## 2022-03-18 PROCEDURE — 80076 HEPATIC FUNCTION PANEL: CPT | Performed by: FAMILY MEDICINE

## 2022-03-18 PROCEDURE — 36415 COLL VENOUS BLD VENIPUNCTURE: CPT | Performed by: FAMILY MEDICINE

## 2022-03-18 PROCEDURE — 59025 FETAL NON-STRESS TEST: CPT | Performed by: FAMILY MEDICINE

## 2022-03-18 PROCEDURE — 99207 PR PRENATAL VISIT: CPT | Performed by: FAMILY MEDICINE

## 2022-03-18 NOTE — PROGRESS NOTES
SUBJECTIVE:  See Pratik  at 38w0d. Estimated Date of Delivery: 2022.  She is doing well. She denies vaginal bleeding, leakage of fluid, or urinary symptoms. Fetal movement is present. She is not having contractions.  Concerns: none  ROS  Negative except as noted above in HPI  OBJECTIVE:  Blood pressure 122/78, pulse 98, temperature 98.8  F (37.1  C), temperature source Oral, resp. rate 18, weight 114.4 kg (252 lb 4 oz), last menstrual period 2021, SpO2 98 %, not currently breastfeeding.  Gen: Comfortable, no acute distress.  Abd: Gravid, non-tender  See OB Vitals flowsheet.  ASSESSMENT/PLAN:  See was seen today for prenatal care.    Diagnoses and all orders for this visit:    Supervision of high-risk pregnancy with grand multiparity in third trimester:  Non Stress Test (NST)  Performed on 3/18/2022 at 38w0d.    Fetal Heart Rate Base Line: 140   Accelerations: present  Reactive: yes  Decelerations: none  Uterine Activity: none  Interpretation: reactive      Fatty liver: LFTs last tested on  and per chart, should be tested monthly. Will recheck today. Continue weekly BPP/NST  -     Hepatic panel (Albumin, ALT, AST, Bili, Alk Phos, TP)    Morbid obesity (H): continue weekly BPP/NST until delivery.     Antonieta De La Rosa MD

## 2022-03-22 ENCOUNTER — HOSPITAL ENCOUNTER (INPATIENT)
Facility: HOSPITAL | Age: 32
LOS: 2 days | Discharge: HOME | End: 2022-03-25
Attending: FAMILY MEDICINE | Admitting: FAMILY MEDICINE
Payer: COMMERCIAL

## 2022-03-23 LAB
ABO/RH(D): NORMAL
ANTIBODY SCREEN: NEGATIVE
CREAT SERPL-MCNC: 0.65 MG/DL (ref 0.6–1.1)
GFR SERPL CREATININE-BSD FRML MDRD: >90 ML/MIN/1.73M2
HGB BLD-MCNC: 12 G/DL (ref 11.7–15.7)
HOLD SPECIMEN: NORMAL
PLATELET # BLD AUTO: 240 10E3/UL (ref 150–450)
SARS-COV-2 RNA RESP QL NAA+PROBE: NEGATIVE
SPECIMEN EXPIRATION DATE: NORMAL
T PALLIDUM AB SER QL: NONREACTIVE

## 2022-03-23 PROCEDURE — 86780 TREPONEMA PALLIDUM: CPT | Performed by: FAMILY MEDICINE

## 2022-03-23 PROCEDURE — 250N000009 HC RX 250

## 2022-03-23 PROCEDURE — 59400 OBSTETRICAL CARE: CPT | Performed by: FAMILY MEDICINE

## 2022-03-23 PROCEDURE — 250N000011 HC RX IP 250 OP 636

## 2022-03-23 PROCEDURE — 722N000001 HC LABOR CARE VAGINAL DELIVERY SINGLE

## 2022-03-23 PROCEDURE — 82565 ASSAY OF CREATININE: CPT | Performed by: FAMILY MEDICINE

## 2022-03-23 PROCEDURE — 85018 HEMOGLOBIN: CPT | Performed by: FAMILY MEDICINE

## 2022-03-23 PROCEDURE — 87635 SARS-COV-2 COVID-19 AMP PRB: CPT | Performed by: FAMILY MEDICINE

## 2022-03-23 PROCEDURE — 250N000011 HC RX IP 250 OP 636: Performed by: FAMILY MEDICINE

## 2022-03-23 PROCEDURE — 250N000013 HC RX MED GY IP 250 OP 250 PS 637: Performed by: FAMILY MEDICINE

## 2022-03-23 PROCEDURE — 36415 COLL VENOUS BLD VENIPUNCTURE: CPT | Performed by: FAMILY MEDICINE

## 2022-03-23 PROCEDURE — 85049 AUTOMATED PLATELET COUNT: CPT | Performed by: FAMILY MEDICINE

## 2022-03-23 PROCEDURE — 120N000001 HC R&B MED SURG/OB

## 2022-03-23 PROCEDURE — 86850 RBC ANTIBODY SCREEN: CPT | Performed by: FAMILY MEDICINE

## 2022-03-23 RX ORDER — KETOROLAC TROMETHAMINE 30 MG/ML
INJECTION, SOLUTION INTRAMUSCULAR; INTRAVENOUS
Status: COMPLETED
Start: 2022-03-23 | End: 2022-03-23

## 2022-03-23 RX ORDER — ACETAMINOPHEN 325 MG/1
650 TABLET ORAL EVERY 4 HOURS PRN
Status: DISCONTINUED | OUTPATIENT
Start: 2022-03-23 | End: 2022-03-25 | Stop reason: HOSPADM

## 2022-03-23 RX ORDER — OXYTOCIN/0.9 % SODIUM CHLORIDE 30/500 ML
340 PLASTIC BAG, INJECTION (ML) INTRAVENOUS CONTINUOUS PRN
Status: COMPLETED | OUTPATIENT
Start: 2022-03-23 | End: 2022-03-23

## 2022-03-23 RX ORDER — BISACODYL 10 MG
10 SUPPOSITORY, RECTAL RECTAL DAILY PRN
Status: DISCONTINUED | OUTPATIENT
Start: 2022-03-23 | End: 2022-03-25 | Stop reason: HOSPADM

## 2022-03-23 RX ORDER — METHYLERGONOVINE MALEATE 0.2 MG/ML
200 INJECTION INTRAVENOUS
Status: DISCONTINUED | OUTPATIENT
Start: 2022-03-23 | End: 2022-03-25 | Stop reason: HOSPADM

## 2022-03-23 RX ORDER — OXYTOCIN 10 [USP'U]/ML
10 INJECTION, SOLUTION INTRAMUSCULAR; INTRAVENOUS
Status: DISCONTINUED | OUTPATIENT
Start: 2022-03-23 | End: 2022-03-25 | Stop reason: HOSPADM

## 2022-03-23 RX ORDER — SODIUM CHLORIDE, SODIUM LACTATE, POTASSIUM CHLORIDE, CALCIUM CHLORIDE 600; 310; 30; 20 MG/100ML; MG/100ML; MG/100ML; MG/100ML
INJECTION, SOLUTION INTRAVENOUS CONTINUOUS
Status: DISCONTINUED | OUTPATIENT
Start: 2022-03-23 | End: 2022-03-23 | Stop reason: HOSPADM

## 2022-03-23 RX ORDER — OXYTOCIN/0.9 % SODIUM CHLORIDE 30/500 ML
100-340 PLASTIC BAG, INJECTION (ML) INTRAVENOUS CONTINUOUS PRN
Status: DISCONTINUED | OUTPATIENT
Start: 2022-03-23 | End: 2022-03-25 | Stop reason: HOSPADM

## 2022-03-23 RX ORDER — NALOXONE HYDROCHLORIDE 0.4 MG/ML
0.2 INJECTION, SOLUTION INTRAMUSCULAR; INTRAVENOUS; SUBCUTANEOUS
Status: DISCONTINUED | OUTPATIENT
Start: 2022-03-23 | End: 2022-03-23 | Stop reason: HOSPADM

## 2022-03-23 RX ORDER — METOCLOPRAMIDE 10 MG/1
10 TABLET ORAL EVERY 6 HOURS PRN
Status: DISCONTINUED | OUTPATIENT
Start: 2022-03-23 | End: 2022-03-23 | Stop reason: HOSPADM

## 2022-03-23 RX ORDER — CARBOPROST TROMETHAMINE 250 UG/ML
250 INJECTION, SOLUTION INTRAMUSCULAR
Status: DISCONTINUED | OUTPATIENT
Start: 2022-03-23 | End: 2022-03-23 | Stop reason: HOSPADM

## 2022-03-23 RX ORDER — CITRIC ACID/SODIUM CITRATE 334-500MG
30 SOLUTION, ORAL ORAL
Status: DISCONTINUED | OUTPATIENT
Start: 2022-03-23 | End: 2022-03-23 | Stop reason: HOSPADM

## 2022-03-23 RX ORDER — DOCUSATE SODIUM 100 MG/1
100 CAPSULE, LIQUID FILLED ORAL DAILY
Status: DISCONTINUED | OUTPATIENT
Start: 2022-03-23 | End: 2022-03-25 | Stop reason: HOSPADM

## 2022-03-23 RX ORDER — IBUPROFEN 800 MG/1
800 TABLET, FILM COATED ORAL EVERY 6 HOURS PRN
Status: DISCONTINUED | OUTPATIENT
Start: 2022-03-23 | End: 2022-03-25 | Stop reason: HOSPADM

## 2022-03-23 RX ORDER — MISOPROSTOL 200 UG/1
TABLET ORAL
Status: DISPENSED
Start: 2022-03-23 | End: 2022-03-23

## 2022-03-23 RX ORDER — OXYTOCIN/0.9 % SODIUM CHLORIDE 30/500 ML
340 PLASTIC BAG, INJECTION (ML) INTRAVENOUS CONTINUOUS PRN
Status: DISCONTINUED | OUTPATIENT
Start: 2022-03-23 | End: 2022-03-25 | Stop reason: HOSPADM

## 2022-03-23 RX ORDER — MISOPROSTOL 200 UG/1
800 TABLET ORAL
Status: DISCONTINUED | OUTPATIENT
Start: 2022-03-23 | End: 2022-03-23 | Stop reason: HOSPADM

## 2022-03-23 RX ORDER — NALOXONE HYDROCHLORIDE 0.4 MG/ML
0.4 INJECTION, SOLUTION INTRAMUSCULAR; INTRAVENOUS; SUBCUTANEOUS
Status: DISCONTINUED | OUTPATIENT
Start: 2022-03-23 | End: 2022-03-23 | Stop reason: HOSPADM

## 2022-03-23 RX ORDER — MISOPROSTOL 200 UG/1
800 TABLET ORAL
Status: DISCONTINUED | OUTPATIENT
Start: 2022-03-23 | End: 2022-03-25 | Stop reason: HOSPADM

## 2022-03-23 RX ORDER — OXYTOCIN 10 [USP'U]/ML
INJECTION, SOLUTION INTRAMUSCULAR; INTRAVENOUS
Status: DISPENSED
Start: 2022-03-23 | End: 2022-03-23

## 2022-03-23 RX ORDER — PENICILLIN G 3000000 [IU]/50ML
3 INJECTION, SOLUTION INTRAVENOUS EVERY 4 HOURS
Status: DISCONTINUED | OUTPATIENT
Start: 2022-03-23 | End: 2022-03-23 | Stop reason: HOSPADM

## 2022-03-23 RX ORDER — KETOROLAC TROMETHAMINE 30 MG/ML
30 INJECTION, SOLUTION INTRAMUSCULAR; INTRAVENOUS
Status: DISCONTINUED | OUTPATIENT
Start: 2022-03-23 | End: 2022-03-25 | Stop reason: HOSPADM

## 2022-03-23 RX ORDER — PENICILLIN G POTASSIUM 5000000 [IU]/1
5 INJECTION, POWDER, FOR SOLUTION INTRAMUSCULAR; INTRAVENOUS ONCE
Status: COMPLETED | OUTPATIENT
Start: 2022-03-23 | End: 2022-03-23

## 2022-03-23 RX ORDER — IBUPROFEN 800 MG/1
800 TABLET, FILM COATED ORAL
Status: DISCONTINUED | OUTPATIENT
Start: 2022-03-23 | End: 2022-03-25 | Stop reason: HOSPADM

## 2022-03-23 RX ORDER — FENTANYL CITRATE 50 UG/ML
100 INJECTION, SOLUTION INTRAMUSCULAR; INTRAVENOUS
Status: DISCONTINUED | OUTPATIENT
Start: 2022-03-23 | End: 2022-03-23 | Stop reason: HOSPADM

## 2022-03-23 RX ORDER — MODIFIED LANOLIN
OINTMENT (GRAM) TOPICAL
Status: DISCONTINUED | OUTPATIENT
Start: 2022-03-23 | End: 2022-03-25 | Stop reason: HOSPADM

## 2022-03-23 RX ORDER — OXYTOCIN/0.9 % SODIUM CHLORIDE 30/500 ML
PLASTIC BAG, INJECTION (ML) INTRAVENOUS
Status: COMPLETED
Start: 2022-03-23 | End: 2022-03-23

## 2022-03-23 RX ORDER — ONDANSETRON 4 MG/1
4 TABLET, ORALLY DISINTEGRATING ORAL EVERY 6 HOURS PRN
Status: DISCONTINUED | OUTPATIENT
Start: 2022-03-23 | End: 2022-03-23 | Stop reason: HOSPADM

## 2022-03-23 RX ORDER — METOCLOPRAMIDE HYDROCHLORIDE 5 MG/ML
10 INJECTION INTRAMUSCULAR; INTRAVENOUS EVERY 6 HOURS PRN
Status: DISCONTINUED | OUTPATIENT
Start: 2022-03-23 | End: 2022-03-23 | Stop reason: HOSPADM

## 2022-03-23 RX ORDER — TRANEXAMIC ACID 100 MG/ML
INJECTION, SOLUTION INTRAVENOUS
Status: COMPLETED
Start: 2022-03-23 | End: 2022-03-23

## 2022-03-23 RX ORDER — CARBOPROST TROMETHAMINE 250 UG/ML
250 INJECTION, SOLUTION INTRAMUSCULAR
Status: DISCONTINUED | OUTPATIENT
Start: 2022-03-23 | End: 2022-03-25 | Stop reason: HOSPADM

## 2022-03-23 RX ORDER — MISOPROSTOL 200 UG/1
400 TABLET ORAL
Status: DISCONTINUED | OUTPATIENT
Start: 2022-03-23 | End: 2022-03-23 | Stop reason: HOSPADM

## 2022-03-23 RX ORDER — LIDOCAINE 40 MG/G
CREAM TOPICAL
Status: DISCONTINUED | OUTPATIENT
Start: 2022-03-23 | End: 2022-03-23 | Stop reason: HOSPADM

## 2022-03-23 RX ORDER — MISOPROSTOL 200 UG/1
400 TABLET ORAL
Status: DISCONTINUED | OUTPATIENT
Start: 2022-03-23 | End: 2022-03-25 | Stop reason: HOSPADM

## 2022-03-23 RX ORDER — HYDROCORTISONE 2.5 %
CREAM (GRAM) TOPICAL 3 TIMES DAILY PRN
Status: DISCONTINUED | OUTPATIENT
Start: 2022-03-23 | End: 2022-03-25 | Stop reason: HOSPADM

## 2022-03-23 RX ORDER — ONDANSETRON 2 MG/ML
4 INJECTION INTRAMUSCULAR; INTRAVENOUS EVERY 6 HOURS PRN
Status: DISCONTINUED | OUTPATIENT
Start: 2022-03-23 | End: 2022-03-23 | Stop reason: HOSPADM

## 2022-03-23 RX ORDER — OXYTOCIN 10 [USP'U]/ML
10 INJECTION, SOLUTION INTRAMUSCULAR; INTRAVENOUS
Status: DISCONTINUED | OUTPATIENT
Start: 2022-03-23 | End: 2022-03-23 | Stop reason: HOSPADM

## 2022-03-23 RX ORDER — PROCHLORPERAZINE 25 MG
25 SUPPOSITORY, RECTAL RECTAL EVERY 12 HOURS PRN
Status: DISCONTINUED | OUTPATIENT
Start: 2022-03-23 | End: 2022-03-23 | Stop reason: HOSPADM

## 2022-03-23 RX ORDER — PROCHLORPERAZINE MALEATE 10 MG
10 TABLET ORAL EVERY 6 HOURS PRN
Status: DISCONTINUED | OUTPATIENT
Start: 2022-03-23 | End: 2022-03-23 | Stop reason: HOSPADM

## 2022-03-23 RX ORDER — METHYLERGONOVINE MALEATE 0.2 MG/ML
200 INJECTION INTRAVENOUS
Status: DISCONTINUED | OUTPATIENT
Start: 2022-03-23 | End: 2022-03-23 | Stop reason: HOSPADM

## 2022-03-23 RX ADMIN — ACETAMINOPHEN 650 MG: 325 TABLET ORAL at 17:14

## 2022-03-23 RX ADMIN — Medication 1 APPLICATOR: at 09:28

## 2022-03-23 RX ADMIN — KETOROLAC TROMETHAMINE 30 MG: 30 INJECTION, SOLUTION INTRAMUSCULAR; INTRAVENOUS at 02:30

## 2022-03-23 RX ADMIN — Medication 340 ML/HR: at 02:17

## 2022-03-23 RX ADMIN — ACETAMINOPHEN 650 MG: 325 TABLET ORAL at 02:49

## 2022-03-23 RX ADMIN — ENOXAPARIN SODIUM 40 MG: 40 INJECTION SUBCUTANEOUS at 17:16

## 2022-03-23 RX ADMIN — TRANEXAMIC ACID 1 G: 100 INJECTION, SOLUTION INTRAVENOUS at 01:23

## 2022-03-23 RX ADMIN — KETOROLAC TROMETHAMINE 30 MG: 30 INJECTION, SOLUTION INTRAMUSCULAR at 02:30

## 2022-03-23 RX ADMIN — PENICILLIN G POTASSIUM 5 MILLION UNITS: 5000000 POWDER, FOR SOLUTION INTRAMUSCULAR; INTRAPLEURAL; INTRATHECAL; INTRAVENOUS at 00:49

## 2022-03-23 RX ADMIN — IBUPROFEN 800 MG: 800 TABLET, FILM COATED ORAL at 08:56

## 2022-03-23 RX ADMIN — DOCUSATE SODIUM 100 MG: 100 CAPSULE, LIQUID FILLED ORAL at 08:56

## 2022-03-23 ASSESSMENT — ACTIVITIES OF DAILY LIVING (ADL)
CHANGE_IN_FUNCTIONAL_STATUS_SINCE_ONSET_OF_CURRENT_ILLNESS/INJURY: NO
DIFFICULTY_EATING/SWALLOWING: NO
DOING_ERRANDS_INDEPENDENTLY_DIFFICULTY: NO
FALL_HISTORY_WITHIN_LAST_SIX_MONTHS: NO
DRESSING/BATHING_DIFFICULTY: NO
WALKING_OR_CLIMBING_STAIRS_DIFFICULTY: NO
CONCENTRATING,_REMEMBERING_OR_MAKING_DECISIONS_DIFFICULTY: NO
WEAR_GLASSES_OR_BLIND: NO
TOILETING_ISSUES: NO

## 2022-03-23 NOTE — H&P
Maternal Admission H&P  [facility]  Date of Admission: 3/22/2022  Date of Service: 3/23/2022    Name      See Pratik WICK       1990  MRN       [id number]  PCP        Vanessa Fregoso at [facility], [phone].    ________________________________________________________________________    Assessment and Plan:  31 year old  at 38w5d.    Baby AGA. Anticipate .    Group B strep: positive, will give abx, plan on 48 hour stay    H/o PPH, give TXA at 8cm, 2 iv's present, have meds in room    Prepare for LGA baby, break bed    Encouraged to stand at the bedside.      Encourage fluids as pt says she didn't drink much the evening prior to arrival.    ________________________________________________________________________    Chief Complaint: active labor management.    HPI: See Pratik is a 31 year old woman at 38w5d, based on an Estimated Date of Delivery: 2022. She presents with contractions that started at 6pm, then became worse around 9pm.  Baby was moving well.  She saw some bloody show.  No lof.  Prior rapid delivery.  Since arrival her contractions have spaced out.    H/o PPH.   This pregnancy was complicated by severe fatty liver, morbid obesity pre pregnancy BMI was 43.7,  for which she had routine bpp and nst starting at 36 weeks.    gbs positive.   H/o ovarian cancer, followed by gyn onc.    Failed her 1 hour glucose, had 1 abnormal value on her 3 hour.  Total weight gain this pregnancy was 6kg.          Patient Active Problem List    Diagnosis Date Noted     Pregnancy 2022     Priority: Medium     Encounter for triage in pregnant patient 2022     Priority: Medium     Neoplasm of uncertain behavior of ovary 2022     Priority: Medium     Fatty liver 2020     Priority: Medium     Severe.           Cardiomegaly 2020     Priority: Medium     Noted incidentally on imaging.           Elevated LFTs 2020      Priority: Medium     Thought to be due to fatty liver         Granulosa cell tumor 2019     Priority: Medium     S/P laparoscopy 2019     Priority: Medium     Complex ovarian cyst 2018     Priority: Medium     Noted on ultrasound.  Recommended follow up at 8 weeks.           Anemia 2017     Priority: Medium     Postpartum hemorrhage of vagina 2017     Priority: Medium     Received pitocin IM and IV, cytotec, and methergine IM         Morbid obesity (H) 2016     Priority: Medium      OB History    Para Term  AB Living   4 3 3 0 0 3   SAB IAB Ectopic Multiple Live Births   0 0 0 0 3      # Outcome Date GA Lbr Kyree/2nd Weight Sex Delivery Anes PTL Lv   4 Current            3 Term 19 39w0d [id number] / 00:02 2.551 kg (5 lb 10 oz) M Vag-Spont None N ARMANDO      Name: CHANDANMALE-SEE      Apgar1: 9  Apgar5: 9   2 Term 17 39w5d 03:29 / 00:06 3.86 kg (8 lb 8.2 oz) M Vag-Spont Local N ARMANDO      Name: HAWLEYMALE-SEE      Apgar1: 8  Apgar5: 10   1 Term 02/23/15 39w1d 04:39 / 04:18 3.43 kg (7 lb 9 oz) M Vag-Spont EPI N ARMANDO      Name: CHANDANMALE-SEE      Apgar1: 8  Apgar5: 9     Review of Systems Negative except what is noted in HPI.   Past Medical History:   Diagnosis Date     Granulosa cell tumor of ovary      Hepatic steatosis      Migraine      Ovarian cancer (H)       Past Surgical History:   Procedure Laterality Date     IR CHEST PORT PLACEMENT > 5 YRS OF AGE  2020     IR PORT PLACEMENT >5 YEARS  2020     FL LAP,DIAGNOSTIC ABDOMEN N/A 2019    Procedure: LAPAROSCOPY, DIAGNOSTIC, RIGHT OOPHERECTOMY;  Surgeon: Leeann Elizabeth DO;  Location: [facility] [place];  Service: Gynecology     FL LAP,DIAGNOSTIC ABDOMEN N/A 2020    Procedure: LAPAROSCOPIC OMENTECTOMY, WASHINGS, RIGHT SALPINGECTOMY;  Surgeon: Adrianne Hua MD;  Location: [facility] [place];  Service: Gynecology Oncology   Patient has no known allergies.  Family History    Problem Relation Age of Onset     No Known Problems Mother      No Known Problems Sister      No Known Problems Brother      Social History     Socioeconomic History     Marital status: Single     Spouse name: Not on file     Number of children: Not on file     Years of education: Not on file     Highest education level: Not on file   Occupational History     Not on file   Tobacco Use     Smoking status: Passive Smoke Exposure - Never Smoker     Smokeless tobacco: Never Used     Tobacco comment: Spouse smokes outside   Substance and Sexual Activity     Alcohol use: Never     Drug use: Never     Sexual activity: Yes     Partners: Male   Other Topics Concern     Not on file   Social History Narrative     Not on file     Social Determinants of Health     Financial Resource Strain: Not on file   Food Insecurity: Not on file   Transportation Needs: Not on file   Physical Activity: Not on file   Stress: Not on file   Social Connections: Not on file   Intimate Partner Violence: Not on file   Housing Stability: Not on file     Prior to Admission Medication List  Medications Prior to Admission   Medication Sig Dispense Refill Last Dose     acetaminophen (TYLENOL) 325 MG tablet [ACETAMINOPHEN (TYLENOL) 325 MG TABLET] Take 2 tablets (650 mg total) by mouth every 6 (six) hours as needed for pain.  0      loratadine (CLARITIN) 10 MG tablet Take 1 tablet (10 mg) by mouth daily 90 tablet 3      Prenatal MV-Min-Fe Fum-FA-DHA (PRENATAL MULTIVITAMIN PLUS DHA) 27-0.8-250 MG CAPS Take 1 tablet by mouth daily 30 capsule 12      vitamin D3 (CHOLECALCIFEROL) 10 MCG (400 UNIT) capsule Take 1 capsule (400 Units) by mouth daily 90 capsule 3       Allergies  No Known Allergies  Immunization History   Administered Date(s) Administered     COVID-19,PF,Pfizer (12+ Yrs) 06/23/2021, 07/14/2021     DT (PEDS <7y) 04/29/2003     HPV9 06/14/2016, 08/08/2017, 08/21/2018     Hep B, Peds or Adolescent 1990     HepA-Adult 04/13/2015     HepB,  Unspecified 1990     HepB-Adult 04/13/2015     Influenza Quad, Recombinant, pf(RIV4) (Flublok) 09/24/2014     Influenza Vaccine IM > 6 months Valent IIV4 (Alfuria,Fluzone) 09/24/2014, 11/29/2016, 09/06/2019, 09/30/2021     Influenza Vaccine, 6+MO IM (QUADRIVALENT W/PRESERVATIVES) 10/12/2018     MMR 1990, 12/19/2019     Td (Adult), Adsorbed 04/29/2003     Td,adult,historic,unspecified 04/29/2003     Tdap (Adacel,Boostrix) 01/02/2015, 05/01/2017, 10/04/2019, 01/07/2022      Physical Exam  Patient Vitals for the past 24 hrs:   Temp   03/22/22 2353 98.1  F (36.7  C)     Wt Readings from Last 1 Encounters:   03/18/22 114.4 kg (252 lb 4 oz)   Prepregnancy: 108.4 kg (239 lb), BMI 43.70. Total gain: 6.01 kg (13 lb 4 oz) (expected gain: 5 kg (11 lb)-9 kg (19 lb)).    HEART: RRR, no murmur  LUNGS: CTA bilaterally  Fetal Heart Tones: Baseline   bpm, variability moderate (6-25 bpm), no decelerations. accels noted.  Reactive strip noted.  Category 1  CONTRACTIONS:  irregular, every 10-15 minutes.  CERVIX: dilation 6 cm per nursing   FLUID: None noted.  Fetal Presentation: vertex.  Labs    Group B Strep PCR   Date Value Ref Range Status   03/04/2022 Positive (A) Negative Final     Comment:     ALERT: Streptococcus agalactiae (Group B Streptococcus) has a high rate of resistance to clindamycin. Therefore, clindamycin is not recommended for treatment unless susceptibility testing has been performed.      Antibody Screen   Date Value Ref Range Status   01/14/2022 Negative Negative Final     Hepatitis B Surface Antigen   Date Value Ref Range Status   09/30/2021 Nonreactive Nonreactive Final     Neisseria gonorrhoeae   Date Value Ref Range Status   07/05/2019 Negative Negative Final     Hemoglobin   Date Value Ref Range Status   01/07/2022 11.0 (L) 11.7 - 15.7 g/dL Final      No visits with results within 2 Day(s) from this visit.   Latest known visit with results is:   Prenatal Office Visit on 03/18/2022   Component Date  Value Ref Range Status     Bilirubin Total 03/18/2022 0.8  0.0 - 1.0 mg/dL Final     Bilirubin Direct 03/18/2022 0.2  <=0.5 mg/dL Final     Protein Total 03/18/2022 6.8  6.0 - 8.0 g/dL Final     Albumin 03/18/2022 2.8 (A) 3.5 - 5.0 g/dL Final     Alkaline Phosphatase 03/18/2022 220 (A) 45 - 120 U/L Final     AST 03/18/2022 12  0 - 40 U/L Final     ALT 03/18/2022 9  0 - 45 U/L Final        Completed by:   Vanessa Fregoso MD  [facility]  3/23/2022 12:49 AM   used: Not needed.

## 2022-03-23 NOTE — L&D DELIVERY NOTE
OB Vaginal Delivery Note    See Pratik MRN# [id number]   Age: 31 year old YOB: 1990       GA: 38w5d  GP:   Labor Complications: None   EBL:   mL  Delivery QBL: 300 mL  Delivery Type:    ROM to Delivery Time: (Delivered) Minutes: 0  [place] Weight: 3.43 kg (7 lb 9 oz)    1 Minute 5 Minute 10 Minute   Apgar Totals: 8           MICHEAL DICKINSON;GINA DE LA PAZ;MEME RO     Delivery Details:  See Pratik, a 31 year old  female arrived in labor at 6-7cm.  She received TXA at 9cm.  She received 1 dose of abx prior to delivery for gbs positive.  She slowly progressed to complete and felt a strong urge to push when she was still 9cm.  She breathed through ctx until she felt a stronger urge to push and was found to have an asynclitic baby, with a rim left on the right.  This rim was reduced and she then pushed the baby to , at which time she ruptured clear fluid.  she delivered a viable infant with apgars of 8  and  .  Delivery was via   to a sterile field under none  anesthesia. Infant delivered in vertex  left  occiput  anterior  position. Anterior and posterior shoulders delivered without difficulty. A nuchal arm was noted. The cord was clamped after 1.5 minutes, cut twice and 3 vessels  were noted. Cord blood was obtained in routine fashion with the following disposition: discard .    Baby was placed skin to skin with mom.      Cord complications: none   Placenta delivered at 3/23/2022  2:19 AM . Placental disposition was Hospital disposal . Fundal massage performed and fundus found to be firm.     Episiotomy: none    Perineum, vagina, cervix were inspected, and the following lacerations were noted:   Perineal lacerations:    None.                     Excellent hemostasis was noted. Needle count correct. Infant and patient in delivery room in good and stable condition.   lovenox ordered for mom postpartum.         Pratik, Female-See [[id number]]    Labor Event Times    Labor onset  date: 3/22/22 Onset time:  6:00 PM   Dilation complete date: 3/23/22 Complete time:  2:06 AM   Start pushing date/time: 3/23/2022 0131      Labor Events     labor?: No   steroids: None  Labor Type: Spontaneous     Antibiotics received during labor?: Yes  Reason for Antibiotics: GBS  Antibiotics received for GBS: Penicillin     Rupture date/time: 3/23/22 0214   Rupture type: Spontaneous rupture of membranes occuring during spontaneous labor or augmentation  Fluid color: Clear  Fluid odor: Normal     Augmentation: None  1:1 continuous labor support provided by?: RN       Delivery/Placenta Date and Time    Delivery Date: 3/23/22 Delivery Time:  2:14 AM   Placenta Date/Time: 3/23/2022  2:19 AM  Oxytocin given at the time of delivery: after delivery of baby  Delivering clinician: Vanessa Fregoso MD   Other personnel present at delivery:  Provider Role   Any De La Torre RN Delivery Nurse   Kim Stern RN Registered Nurse   Martha Javier RN Registered Nurse         Vaginal Counts     Initial count performed by 2 team members:  Two Team Members   Any Stern       [place] Suture Needles Sponges (RETIRED) Instruments   Initial counts   5    Added to count       Relief counts       Final counts             Placed during labor Accounted for at the end of labor   FSE NA    IUPC NA    Cervidil NA               Final count performed by 2 team members:  Two Team Members   Dr Ildefonso De La Torre      Final count correct?: Yes     Apgars    Living status: Living   1 Minute 5 Minute 10 Minute 15 Minute 20 Minute   Skin color: 0  1       Heart rate: 2  2       Reflex irritability: 2  2       Muscle tone: 2  2       Respiratory effort: 2        Total: 8        Apgars assigned by: KIM STERN     Cord    Vessels: 3 Vessels    Cord Complications: None               Cord Blood Disposition: Discard    Gases Sent?: No    Delayed cord clamping?: Yes    Cord Clamping Delay (seconds): >120  "seconds    Stem cell collection?: No       [place] Resuscitation    Methods: None      Measurements    Weight: 7 lb 9 oz Length: 1' 7.5\"   Head circumference: 33 cm       Skin to Skin and Feeding Plan    Skin to skin initiation date/time: 1/3/1841    Skin to skin with: Mother  Skin to skin end date/time: 1/3/1841       Labor Events and Shoulder Dystocia    Fetal Tracing Prior to Delivery: Category 1  Shoulder dystocia present?: Neg     Delivery (Maternal) (Provider to Complete) ([id number])    Episiotomy: None  Repair suture: None  Genital tract inspection done: Pos     Blood Loss  Mother: Pratik, See #[id number]   Start of Mother's Information    Delivery Blood Loss  22 1800 - 22 0246    Delivery QBL (mL) Hospital Encounter 300 mL    Total  300 mL         End of Mother's Information  Mother: Pratik, See #[id number]          Delivery - Provider to Complete ([id number])    Delivering clinician: Vanessa Fregoso MD                   Other personnel:  Provider Role   Any De La Torre, RN Delivery Nurse   Kim Stern RN Registered Nurse   Martha Javier RN Registered Nurse                Placenta    Date/Time: 3/23/2022  2:19 AM  Removal: Spontaneous  Disposition: Hospital disposal           Anesthesia    Method: None                Presentation and Position    Presentation: Vertex    Position: Left Occiput Anterior                 Vanessa Fregoso MD  "

## 2022-03-23 NOTE — PROGRESS NOTES
Verified with Dr. De La Rosa patient is okay to have tylenol with hx of hepatic steatosis. Per Dr. De La Rosa, okay to give patient tylenol as it is ordered

## 2022-03-23 NOTE — PLAN OF CARE
Care Plan Progress Note      Name: Mahesh Christie  : 1990  MRN: [id number]    VS: /71   Pulse 54   Temp 98.1  F (36.7  C)   Resp 20   LMP 2021 (Exact Date)   SpO2 98%       Problem: Adjustment to Role Transition (Postpartum Vaginal Delivery)  Goal: Successful Maternal Role Transition  Outcome: Ongoing, Progressing     Problem: Bleeding (Postpartum Vaginal Delivery)  Goal: Hemostasis  Outcome: Ongoing, Progressing     Problem: Bleeding (Labor)  Goal: Hemostasis  3/23/2022 0310 by Any De La Torre RN  Outcome: Met  3/23/2022 0026 by Any De La Torre RN  Outcome: Ongoing, Progressing     Problem: Change in Fetal Wellbeing (Labor)  Goal: Stable Fetal Wellbeing  3/23/2022 0310 by Any De La Torre RN  Outcome: Met  3/23/2022 0026 by Any De La Torre RN  Outcome: Ongoing, Progressing     Problem: Delayed Labor Progression (Labor)  Goal: Effective Progression to Delivery  3/23/2022 0310 by Any De La Torre RN  Outcome: Met  3/23/2022 0026 by Any De La Torre RN  Outcome: Ongoing, Progressing     Problem: Infection (Labor)  Goal: Absence of Infection Signs and Symptoms  Outcome: Met     Problem: Labor Pain (Labor)  Goal: Acceptable Pain Control  Outcome: Met     Problem: Uterine Tachysystole (Labor)  Goal: Normal Uterine Contraction Pattern  Outcome: Met   VSS,  @0214, fundus -1, firm with scant lochia.  Tylenol and ketorolac given for pain.  TXA, and pit given.  One dose of Abx received prior to delivery for GBS+.  Tired and resting.      Any De La Torre RN  3/23/2022  3:11 AM

## 2022-03-23 NOTE — PLAN OF CARE
Problem: Plan of Care - These are the overarching goals to be used throughout the patient stay.    Goal: Optimal Comfort and Wellbeing  Intervention: Monitor Pain and Promote Comfort  Recent Flowsheet Documentation  Taken 3/23/2022 0856 by Laina Magana RN  Pain Management Interventions: medication (see MAR)   Patient taking ibuprofen Prn, has tylenol PRN for pain control. Given dermoplast for hemorrhoid

## 2022-03-23 NOTE — PROVIDER NOTIFICATION
Spoke with Dr Fregoso about patients admission to unit in labor. SVE 6-7cm dilated.  Ok to start IV and txa soon.  Dr Fregoso is en route   Any De La Torre RN  3/23/2022  12:08 AM

## 2022-03-23 NOTE — PLAN OF CARE
Problem: Plan of Care - These are the overarching goals to be used throughout the patient stay.    Goal: Optimal Comfort and Wellbeing  Intervention: Monitor Pain and Promote Comfort  Recent Flowsheet Documentation  Taken 3/23/2022 1249 by Laina Magana, RN  Pain Management Interventions: declines  Taken 3/23/2022 0856 by Laina Magana, RN  Pain Management Interventions: medication (see MAR)   Patient taking ibuprofen for pain control PRN and gave dermoplast pain spray for hemorrhoid.

## 2022-03-23 NOTE — PLAN OF CARE
Care Plan Progress Note      Name: Mahesh Christie  : 1990  MRN: [id number]    VS: Temp 98.1  F (36.7  C)   LMP 2021 (Exact Date)       Problem: Bleeding (Labor)  Goal: Hemostasis  Outcome: Ongoing, Progressing     Problem: Change in Fetal Wellbeing (Labor)  Goal: Stable Fetal Wellbeing  Outcome: Ongoing, Progressing     Problem: Delayed Labor Progression (Labor)  Goal: Effective Progression to Delivery  Outcome: Ongoing, Progressing             @[facility] 3/23/2022  12:26 AM

## 2022-03-24 PROCEDURE — 99207 PR SUBSEQUENT HOSPITAL CARE FOR MOTHER, 15 MINUTES: CPT | Performed by: FAMILY MEDICINE

## 2022-03-24 PROCEDURE — 120N000001 HC R&B MED SURG/OB

## 2022-03-24 PROCEDURE — 250N000011 HC RX IP 250 OP 636: Performed by: FAMILY MEDICINE

## 2022-03-24 PROCEDURE — 250N000013 HC RX MED GY IP 250 OP 250 PS 637: Performed by: FAMILY MEDICINE

## 2022-03-24 RX ADMIN — ACETAMINOPHEN 650 MG: 325 TABLET ORAL at 00:58

## 2022-03-24 RX ADMIN — ENOXAPARIN SODIUM 40 MG: 40 INJECTION SUBCUTANEOUS at 05:14

## 2022-03-24 RX ADMIN — ACETAMINOPHEN 650 MG: 325 TABLET ORAL at 21:02

## 2022-03-24 RX ADMIN — IBUPROFEN 800 MG: 800 TABLET, FILM COATED ORAL at 00:58

## 2022-03-24 RX ADMIN — DOCUSATE SODIUM 100 MG: 100 CAPSULE, LIQUID FILLED ORAL at 08:52

## 2022-03-24 RX ADMIN — ENOXAPARIN SODIUM 40 MG: 40 INJECTION SUBCUTANEOUS at 16:32

## 2022-03-24 RX ADMIN — IBUPROFEN 800 MG: 800 TABLET, FILM COATED ORAL at 08:55

## 2022-03-24 RX ADMIN — IBUPROFEN 800 MG: 800 TABLET, FILM COATED ORAL at 16:32

## 2022-03-24 NOTE — PLAN OF CARE
Problem: Bleeding (Postpartum Vaginal Delivery)  Goal: Hemostasis  Outcome: Ongoing, Progressing     Problem: Pain (Postpartum Vaginal Delivery)  Goal: Acceptable Pain Control  Outcome: Ongoing, Progressing  Intervention: Prevent or Manage Pain  Recent Flowsheet Documentation  Taken 3/24/2022 0058 by Robert Christie, RN  Pain Management Interventions: medication (see MAR)     Problem: Urinary Retention (Postpartum Vaginal Delivery)  Goal: Effective Urinary Elimination  Outcome: Ongoing, Progressing    Pt VSS and pain managed w/ tylenol and ibuprofen.  Postpartum assessment WNL.  Pt voiding and ambulating.   Tdap and flu up to date.  Needs to complete mood assessment and birth certificate.

## 2022-03-24 NOTE — PLAN OF CARE
"  Problem: Plan of Care - These are the overarching goals to be used throughout the patient stay.    Goal: Patient-Specific Goal (Individualized)  Description: You can add care plan individualizations to a care plan. Examples of Individualization might be:  \"Parent requests to be called daily at 9am for status\", \"I have a hard time hearing out of my right ear\", or \"Do not touch me to wake me up as it startles me\".  Outcome: Ongoing, Progressing  Flowsheets (Taken 3/23/2022 2237)  Individualized Care Needs: wants to rest this evening as much as possible.   Patient has been very independent this shift and is well supported by significant other and mother. She is bonding well with infant and is sleeping at this time. Pain is managed with prn medications (see mar) patient will request medications if and when she desires.     Lovenox started this evening. Patient recalled previously taking this medication after previous birth and had no further questions after education.   "

## 2022-03-24 NOTE — PROGRESS NOTES
Maternal Postpartum Progress Note  [facility]  Date of Service: 3/24/2022    Name      See Pratik         1990  MRN       [id number]  PCP        Vanessa Fregoso    ________________________________________________________________________    Subjective:  Patient denies headache, dizziness, dyspnea, and leg pain.  Ambulating and eating.    Objective:  Patient Vitals for the past 24 hrs:   BP Temp Temp src Pulse Resp SpO2   22 0808 114/70 98  F (36.7  C) Oral 72 18 97 %   22 0109 109/80 97.8  F (36.6  C) Oral 61 18 97 %   22 1706 102/60 98.4  F (36.9  C) Oral 61 17 98 %   22 1249 106/56 98.2  F (36.8  C) Oral 73 18 97 %     General: Alert, comfortable.  Heart: RRR, no murmur.  Lungs: CTA bilaterally.  Abdomen: non-distended. Uterine fundus firm, below umbilicus.  Ext: trace edema, no calf tenderness.    Labs:  No results found for this or any previous visit (from the past 24 hour(s)).     Assessment:   Postpartum Day #1 s/p vaginal delivery.    Patient Active Problem List    Diagnosis Date Noted     Pregnancy 2022     Priority: Medium     Encounter for triage in pregnant patient 2022     Priority: Medium     Neoplasm of uncertain behavior of ovary 2022     Priority: Medium     Fatty liver 2020     Priority: Medium     Severe.           Cardiomegaly 2020     Priority: Medium     Noted incidentally on imaging.           Elevated LFTs 2020     Priority: Medium     Thought to be due to fatty liver         Granulosa cell tumor 2019     Priority: Medium     S/P laparoscopy 2019     Priority: Medium     Complex ovarian cyst 2018     Priority: Medium     Noted on ultrasound.  Recommended follow up at 8 weeks.           Anemia 2017     Priority: Medium     Postpartum hemorrhage of vagina 2017     Priority: Medium     Received pitocin IM and IV, cytotec, and methergine IM         Morbid obesity  (H) 11/29/2016     Priority: Medium        Plan:   - Continue current care.  - Likely home tomorrow.    Completed by:   Antonieta De La Rosa MD, M.D.  [facility]  3/24/2022 10:08 AM   used: Not needed.

## 2022-03-24 NOTE — PLAN OF CARE
Problem: Pain (Postpartum Vaginal Delivery)  Goal: Acceptable Pain Control  Outcome: Ongoing, Progressing   Assess pain, offer pain meds as needed. Pt stated ibuprofen and tylenol has been helpful for pain

## 2022-03-25 VITALS
SYSTOLIC BLOOD PRESSURE: 113 MMHG | OXYGEN SATURATION: 97 % | TEMPERATURE: 98.2 F | HEART RATE: 63 BPM | DIASTOLIC BLOOD PRESSURE: 73 MMHG | RESPIRATION RATE: 19 BRPM

## 2022-03-25 PROCEDURE — 99207 PR SUBSEQUENT HOSPITAL CARE FOR MOTHER, 15 MINUTES: CPT | Performed by: FAMILY MEDICINE

## 2022-03-25 PROCEDURE — 250N000013 HC RX MED GY IP 250 OP 250 PS 637: Performed by: FAMILY MEDICINE

## 2022-03-25 PROCEDURE — 250N000011 HC RX IP 250 OP 636: Performed by: FAMILY MEDICINE

## 2022-03-25 RX ORDER — DOCUSATE SODIUM 100 MG/1
100 CAPSULE, LIQUID FILLED ORAL DAILY
Qty: 60 CAPSULE | Refills: 1 | Status: SHIPPED | OUTPATIENT
Start: 2022-03-26 | End: 2022-05-09

## 2022-03-25 RX ORDER — IBUPROFEN 800 MG/1
800 TABLET, FILM COATED ORAL EVERY 8 HOURS PRN
Qty: 60 TABLET | Refills: 3 | Status: ON HOLD | OUTPATIENT
Start: 2022-03-25 | End: 2024-02-15

## 2022-03-25 RX ADMIN — DOCUSATE SODIUM 100 MG: 100 CAPSULE, LIQUID FILLED ORAL at 08:46

## 2022-03-25 RX ADMIN — ACETAMINOPHEN 650 MG: 325 TABLET ORAL at 08:46

## 2022-03-25 RX ADMIN — ENOXAPARIN SODIUM 40 MG: 40 INJECTION SUBCUTANEOUS at 04:30

## 2022-03-25 RX ADMIN — IBUPROFEN 800 MG: 800 TABLET, FILM COATED ORAL at 04:35

## 2022-03-25 RX ADMIN — ACETAMINOPHEN 650 MG: 325 TABLET ORAL at 04:30

## 2022-03-25 NOTE — PLAN OF CARE
Problem: Plan of Care - These are the overarching goals to be used throughout the patient stay.    Goal: Optimal Comfort and Wellbeing  Intervention: Provide Person-Centered Care  Recent Flowsheet Documentation  Taken 3/24/2022 1600 by Amanda Eagle  Trust Relationship/Rapport:   care explained   questions answered      Patient progressing as expected for postpartum day two., vaginal delivery. Patient independent with cares, pain well controlled with ibuprofen and tylenol. Patient is breastfeeding baby. Fundus firm at U/U. Continuing to monitor.

## 2022-03-25 NOTE — PLAN OF CARE
Problem: Bleeding (Postpartum Vaginal Delivery)  Goal: Hemostasis  Outcome: Ongoing, Progressing     Problem: Pain (Postpartum Vaginal Delivery)  Goal: Acceptable Pain Control  Outcome: Ongoing, Progressing     Problem: Urinary Retention (Postpartum Vaginal Delivery)  Goal: Effective Urinary Elimination  Outcome: Ongoing, Progressing    Pt denies pain and aware tylenol and ibuprofen is available should she need it.  Postpartum assessment WNL.   Tdap and flu up to date.  Mood assessment complete.

## 2022-03-25 NOTE — PLAN OF CARE
"  Problem: Plan of Care - These are the overarching goals to be used throughout the patient stay.    Goal: Plan of Care Review/Shift Note  Description: The Plan of Care Review/Shift note should be completed every shift.  The Outcome Evaluation is a brief statement about your assessment that the patient is improving, declining, or no change.  This information will be displayed automatically on your shift note.  3/25/2022 1338 by Perla Laws RN  Outcome: Met  3/25/2022 1338 by Perla Laws RN  Outcome: Met  Goal: Patient-Specific Goal (Individualized)  Description: You can add care plan individualizations to a care plan. Examples of Individualization might be:  \"Parent requests to be called daily at 9am for status\", \"I have a hard time hearing out of my right ear\", or \"Do not touch me to wake me up as it startles me\".  Outcome: Met  Goal: Absence of Hospital-Acquired Illness or Injury  Outcome: Met  Goal: Optimal Comfort and Wellbeing  Outcome: Met  Intervention: Provide Person-Centered Care  Recent Flowsheet Documentation  Taken 3/25/2022 0825 by Perla Laws, RN  Trust Relationship/Rapport:   care explained   choices provided   questions answered   questions encouraged  Goal: Readiness for Transition of Care  Outcome: Met     Problem: Adjustment to Role Transition (Postpartum Vaginal Delivery)  Goal: Successful Maternal Role Transition  Outcome: Met     Problem: Bleeding (Postpartum Vaginal Delivery)  Goal: Hemostasis  Outcome: Met     Problem: Infection (Postpartum Vaginal Delivery)  Goal: Absence of Infection Signs/Symptoms  Outcome: Met     Problem: Pain (Postpartum Vaginal Delivery)  Goal: Acceptable Pain Control  Outcome: Met     Problem: Urinary Retention (Postpartum Vaginal Delivery)  Goal: Effective Urinary Elimination  Outcome: Met  Intervention: Promote Effective Urinary Elimination  Recent Flowsheet Documentation  Taken 3/25/2022 0825 by Perla Laws, RN  Urinary Elimination Promotion: frequent " voiding encouraged

## 2022-03-25 NOTE — PROGRESS NOTES
"Outreach Note for [facility]    Mahesh Christie  [id number]  1990    Chart reviewed, discharge follow-up plan discussed with attending MD and patient's bedside RN, needs assessed. Post-delivery follow-up appointment with  planned in 6 weeks at the [facility]. Home care nurse visit not ordered due to insurance.     Patient, See, is reported to have support at home, is connected with WIC, and feels ready to discharge with , \"Liset Harkins\". Outreach nurse will continue to follow and assist with discharge planning as needed. No additional needs identified at this time.                   "

## 2022-03-25 NOTE — PROGRESS NOTES
Pt discharged to home at 1420. Writer reviewed AVS with pt including the warning signs and pt verbalized understanding. Pt is given her medications (Ibuprofen and Docusate) and breast pump prescriptions. Pt also educated on bili blanket use at home for the baby and verbalize understanding. Pt has her belonging and going home with her baby. FOB is driving them home.  NA escorted pt out of the hospital.

## 2022-03-25 NOTE — DISCHARGE SUMMARY
Maternal Discharge Summary  [facility]  Date of Service: 3/25/2022    Name      See Pratik         1990  MRN       [id number]  PCP        Vanessa Harrison at [facility], [phone].    ________________________________________________________________________    Assessment:  See Pratik is a 31 year old now  s/p vaginal, spontaneous  at 38w5d on 3/23/22.      Discharge Plan:   1. Discharge to Home. Condition at Discharge:  stable  2. Physical activity: Regular.  3. Diet:  Traditional Hmong postpartum diet.  4. Home care nurse: declined.  5. Lactation clinic appointment: not indicated.  6. Contraception plan: IUD.  7. Follow up with Vanessa Harrison in 6 weeks.  Future Appointments   Date Time Provider Department Center   2022 10:20 AM Vanessa Fregoso MD [facility]   2022 12:00 PM Vanessa Fregoso MD [facility]   2022  9:40 AM Vanessa Fregoso MD [facility]      ________________________________________________________________________    Admission Date:  3/22/2022  Discharge Date:  3/25/2022  Delivery Date:  3/23/22  Gestational Age at Delivery: 38w5d    Principal Diagnosis: Labor and delivery  Delivery type: Vaginal, Spontaneous     Active Problems:    Pregnancy      Subjective:  Patient denies headache, dizziness, dyspnea, and leg pain. Ambulating and eating.    Discharge Exam:  Patient Vitals for the past 24 hrs:   BP Temp Temp src Pulse Resp SpO2   22 0747 113/73 98.2  F (36.8  C) Oral 63 19 97 %   22 0016 104/67 98.2  F (36.8  C) Oral 75 16 97 %   22 1616 114/70 98.2  F (36.8  C) Oral 72 18 97 %     General - alert, comfortable  Heart - RRR, no murmurs  Lungs - CTA bilaterally  Abdomen - fundus firm, nontender, below umbilicus  Extremities - trace edema  Admission on 2022   Component Date Value Ref Range Status     Treponema Antibody Total 2022 Nonreactive  Nonreactive  Final     Hemoglobin 03/23/2022 12.0  11.7 - 15.7 g/dL Final     ABO/RH(D) 03/23/2022 B POS   Final     Antibody Screen 03/23/2022 Negative  Negative Final     SPECIMEN EXPIRATION DATE 03/23/2022 [id number]   Final     SARS CoV2 PCR 03/23/2022 Negative  Negative Final    NEGATIVE: SARS-CoV-2 (COVID-19) RNA not detected, presumed negative.     Hold Specimen 03/23/2022 [facility]   Final     Platelet Count 03/23/2022 240  150 - 450 10e3/uL Final     Creatinine 03/23/2022 0.65  0.60 - 1.10 mg/dL Final     GFR Estimate 03/23/2022 >90  >60 mL/min/1.73m2 Final    Effective December 21, 2021 eGFRcr in adults is calculated using the 2021 CKD-EPI creatinine equation which includes age and gender (Marcos meyer al., NEJ, DOI: 10.1056/FUKTlm8769753)      Discharge Medications:   See medication reconciliation.     Completed by:   Antonieta De La Rosa MD  [facility]  3/25/2022 10:40 AM   used: Not needed.

## 2022-03-26 DIAGNOSIS — Z71.89 OTHER SPECIFIED COUNSELING: ICD-10-CM

## 2022-03-27 ENCOUNTER — PATIENT OUTREACH (OUTPATIENT)
Dept: CARE COORDINATION | Facility: CLINIC | Age: 32
End: 2022-03-27
Payer: COMMERCIAL

## 2022-03-27 NOTE — PROGRESS NOTES
Clinic Care Coordination Contact  [facility]: Post-Discharge Note  SITUATION                                                      Admission:    Admission Date: 22   Reason for Admission: Pregnancy  Discharge:   Discharge Date: 22  Discharge Diagnosis: Pregnancy    BACKGROUND                                                      Per hospital discharge summary and inpatient provider notes:    See Pratik, a 31 year old  female arrived in labor at 6-7cm.  She received TXA at 9cm.  She received 1 dose of abx prior to delivery for gbs positive.  She slowly progressed to complete and felt a strong urge to push when she was still 9cm.  She breathed through ctx until she felt a stronger urge to push and was found to have an asynclitic baby, with a rim left on the right.  This rim was reduced and she then pushed the baby to , at which time she ruptured clear fluid.  she delivered a viable infant with apgars of 8  and  .  Delivery was via   to a sterile field under none  anesthesia. Infant delivered in vertex  left  occiput  anterior  position. Anterior and posterior shoulders delivered without difficulty. A nuchal arm was noted. The cord was clamped after 1.5 minutes, cut twice and 3 vessels  were noted. Cord blood was obtained in routine fashion with the following disposition: discard .    Baby was placed skin to skin with mom.         ASSESSMENT      Enrollment  Primary Care Care Coordination Status: Declined    Discharge Assessment  How are you doing now that you are home?: Patient reports she is doing well  How are your symptoms? (Red Flag symptoms escalate to triage hotline per guidelines): Improved  Do you feel your condition is stable enough to be safe at home until your provider visit?: Yes  Does the patient have their discharge instructions? : Yes  Were you started on any new medications or were there changes to any of your previous medications? : Yes  Does the patient have all of their  medications?: Yes  Do you have questions regarding any of your medications? : No  Discharge follow-up appointment scheduled within 14 calendar days? : Yes  Discharge Follow Up Appointment Date: 04/01/22  Discharge Follow Up Appointment Scheduled with?: Specialty Care Provider                  PLAN                                                      Outpatient Plan:      Follow Up  Follow up with provider in 2 weeks and 6 weeks for post-delivery checks    Future Appointments   Date Time Provider Department Center   4/1/2022 10:20 AM Vanessa Fregoso MD [facility]   4/11/2022 12:00 PM [facility]   4/22/2022  9:40 AM Vanessa Fregoso MD [facility]         For any urgent concerns, please contact our 24 hour nurse triage line: [phone] ([phone])         DANNA Rothman  [phone]  [facility]

## 2022-03-31 ENCOUNTER — MYC MEDICAL ADVICE (OUTPATIENT)
Dept: FAMILY MEDICINE | Facility: CLINIC | Age: 32
End: 2022-03-31
Payer: COMMERCIAL

## 2022-03-31 NOTE — TELEPHONE ENCOUNTER
Dr Fregoso- Please see [employer] message and attached photo in regards to recent snipping of tongue tie.  Please advise    Lauren JENKINS RN  [facility]

## 2022-04-01 NOTE — TELEPHONE ENCOUNTER
PCP: patient reporting sharp intermittent right side pelvic pain. Area is tender/painful with pressure and when urinating.     Judith Harrison RN on 4/1/2022 at 3:20 PM

## 2022-04-11 ENCOUNTER — PRENATAL OFFICE VISIT (OUTPATIENT)
Dept: FAMILY MEDICINE | Facility: CLINIC | Age: 32
End: 2022-04-11
Payer: COMMERCIAL

## 2022-04-11 ENCOUNTER — MEDICAL CORRESPONDENCE (OUTPATIENT)
Dept: HEALTH INFORMATION MANAGEMENT | Facility: CLINIC | Age: 32
End: 2022-04-11

## 2022-04-11 VITALS
DIASTOLIC BLOOD PRESSURE: 70 MMHG | BODY MASS INDEX: 39.89 KG/M2 | TEMPERATURE: 99 F | RESPIRATION RATE: 12 BRPM | WEIGHT: 225.12 LBS | OXYGEN SATURATION: 98 % | HEART RATE: 59 BPM | HEIGHT: 63 IN | SYSTOLIC BLOOD PRESSURE: 100 MMHG

## 2022-04-11 DIAGNOSIS — K76.0 FATTY LIVER: ICD-10-CM

## 2022-04-11 DIAGNOSIS — Z23 NEED FOR VACCINATION: ICD-10-CM

## 2022-04-11 DIAGNOSIS — R30.0 DYSURIA: ICD-10-CM

## 2022-04-11 DIAGNOSIS — D39.10 NEOPLASM OF UNCERTAIN BEHAVIOR OF OVARY, UNSPECIFIED LATERALITY: ICD-10-CM

## 2022-04-11 DIAGNOSIS — Z12.4 CERVICAL CANCER SCREENING: Primary | ICD-10-CM

## 2022-04-11 LAB
ALBUMIN UR-MCNC: 20 MG/DL
APPEARANCE UR: CLEAR
BACTERIA #/AREA URNS HPF: ABNORMAL /HPF
BILIRUB UR QL STRIP: NEGATIVE
COLOR UR AUTO: YELLOW
GLUCOSE UR STRIP-MCNC: NEGATIVE MG/DL
HGB UR QL STRIP: ABNORMAL
KETONES UR STRIP-MCNC: 10 MG/DL
LEUKOCYTE ESTERASE UR QL STRIP: ABNORMAL
MUCOUS THREADS #/AREA URNS LPF: PRESENT /LPF
NITRATE UR QL: NEGATIVE
PH UR STRIP: 5.5 [PH] (ref 5–7)
RBC #/AREA URNS AUTO: ABNORMAL /HPF
SP GR UR STRIP: 1.02 (ref 1–1.03)
SQUAMOUS #/AREA URNS AUTO: ABNORMAL /LPF
UROBILINOGEN UR STRIP-ACNC: 0.2 E.U./DL
WBC #/AREA URNS AUTO: ABNORMAL /HPF

## 2022-04-11 PROCEDURE — 87086 URINE CULTURE/COLONY COUNT: CPT | Performed by: FAMILY MEDICINE

## 2022-04-11 PROCEDURE — 90471 IMMUNIZATION ADMIN: CPT | Performed by: FAMILY MEDICINE

## 2022-04-11 PROCEDURE — 90746 HEPB VACCINE 3 DOSE ADULT IM: CPT | Performed by: FAMILY MEDICINE

## 2022-04-11 PROCEDURE — 99213 OFFICE O/P EST LOW 20 MIN: CPT | Mod: 24 | Performed by: FAMILY MEDICINE

## 2022-04-11 PROCEDURE — 81001 URINALYSIS AUTO W/SCOPE: CPT | Performed by: FAMILY MEDICINE

## 2022-04-11 NOTE — PROGRESS NOTES
"ASSESSMENT/PLAN:   Diagnoses and all orders for this visit:    Cervical cancer screening will be completed at 6 week follow up.      Routine postpartum follow-up  Encouraged regular physical exercise  Encouraged healthy diet.    Reviewed kegel exercises  Reviewed depression post partum, and when to seek help.      Fatty liver  Reviewed diet changes to help with this.    Check lft at next visit.      Neoplasm of uncertain behavior of ovary, unspecified laterality   Will check with gyn/onc if they are ok with combined contraceptive.    Reviewed mirena IUD.      Dysuria  -     UA with Microscopic reflex to Culture - Clinic Collect    Need for vaccination  -     HEPATITIS B VACCINE,ADULT,IM    need pap smear, covid vaccine, and birth  Control at next visit.  Also lft's.          Return in about 4 weeks (around 5/9/2022) for posrt partum. pap smear.  maybe covid vaccine.  .       ======================================================    SUBJECTIVE  See Pratik is a 31 year old female here for   1.  Here for 2 week PP visit.  She is feeling well.  Her bleeding has diminished, and she is still has some occasional spotting.    She is now thinking she might want to do an ocp instead of a mirena.    No ruq pain . No headaches or vision changes.    See is following a traditional hmong PP diet.   No urinary or stool incontience.  She has some mild pain at the end of urination.  Only when she a very full bladder.  No blood in urine.    Does not know when she has a repeat gi visit.                ROS  Complete 10 point review of systems negative except as noted above in HPI      OBJECTIVE  /70   Pulse 59   Temp 99  F (37.2  C) (Oral)   Resp 12   Ht 1.592 m (5' 2.68\")   Wt 102.1 kg (225 lb 1.9 oz)   LMP 06/25/2021 (Exact Date)   SpO2 98%   BMI 40.29 kg/m     Gen:  Nad, alert  No thyromegaly or nodules.    Rrr, no m/r/g  cta bilaterally, no wheezes or rhonchi.    No edema.    abd soft.  Non tender.  Non distended.  No " ruq tenderness.      Current Outpatient Medications   Medication     acetaminophen (TYLENOL) 325 MG tablet     docusate sodium (COLACE) 100 MG capsule     ibuprofen (ADVIL/MOTRIN) 800 MG tablet     loratadine (CLARITIN) 10 MG tablet     Prenatal MV-Min-Fe Fum-FA-DHA (PRENATAL MULTIVITAMIN PLUS DHA) 27-0.8-250 MG CAPS     vitamin D3 (CHOLECALCIFEROL) 10 MCG (400 UNIT) capsule     No current facility-administered medications for this visit.      Patient Active Problem List   Diagnosis     Morbid obesity (H)     Postpartum hemorrhage of vagina     Anemia     Complex ovarian cyst     S/P laparoscopy     Granulosa cell tumor     Fatty liver     Cardiomegaly     Elevated LFTs     Neoplasm of uncertain behavior of ovary     Encounter for triage in pregnant patient     Pregnancy        LABS & IMAGES   No results found for any visits on 04/11/22.      ======================================================    MDM          Options for treatment and follow-up care were reviewed with the patient. Mahesh Christie and/or guardian was engaged and actively involved in the decision making process. Mahesh Christie and/or guardian verbalized understanding of the options discussed and was satisfied with the final plan.      Vanessa Fregoso MD

## 2022-04-13 LAB — BACTERIA UR CULT: NORMAL

## 2022-05-09 ENCOUNTER — OFFICE VISIT (OUTPATIENT)
Dept: FAMILY MEDICINE | Facility: CLINIC | Age: 32
End: 2022-05-09
Payer: COMMERCIAL

## 2022-05-09 VITALS
RESPIRATION RATE: 12 BRPM | TEMPERATURE: 99 F | SYSTOLIC BLOOD PRESSURE: 98 MMHG | BODY MASS INDEX: 38.8 KG/M2 | DIASTOLIC BLOOD PRESSURE: 64 MMHG | HEIGHT: 63 IN | WEIGHT: 219 LBS | OXYGEN SATURATION: 99 % | HEART RATE: 56 BPM

## 2022-05-09 DIAGNOSIS — K59.00 CONSTIPATION, UNSPECIFIED CONSTIPATION TYPE: ICD-10-CM

## 2022-05-09 DIAGNOSIS — Z30.430 ENCOUNTER FOR INSERTION OF INTRAUTERINE CONTRACEPTIVE DEVICE: ICD-10-CM

## 2022-05-09 DIAGNOSIS — Z12.4 CERVICAL CANCER SCREENING: Primary | ICD-10-CM

## 2022-05-09 DIAGNOSIS — Z23 NEED FOR VACCINATION: ICD-10-CM

## 2022-05-09 LAB
HCG UR QL: NEGATIVE
TSH SERPL DL<=0.005 MIU/L-ACNC: 1.87 UIU/ML (ref 0.3–5)

## 2022-05-09 PROCEDURE — 81025 URINE PREGNANCY TEST: CPT | Performed by: FAMILY MEDICINE

## 2022-05-09 PROCEDURE — 99207 PR POST PARTUM EXAM: CPT | Performed by: FAMILY MEDICINE

## 2022-05-09 PROCEDURE — 0054A COVID-19,PF,PFIZER (12+ YRS): CPT | Performed by: FAMILY MEDICINE

## 2022-05-09 PROCEDURE — G0123 SCREEN CERV/VAG THIN LAYER: HCPCS | Performed by: FAMILY MEDICINE

## 2022-05-09 PROCEDURE — 91305 COVID-19,PF,PFIZER (12+ YRS): CPT | Performed by: FAMILY MEDICINE

## 2022-05-09 PROCEDURE — 84443 ASSAY THYROID STIM HORMONE: CPT | Performed by: FAMILY MEDICINE

## 2022-05-09 PROCEDURE — 36415 COLL VENOUS BLD VENIPUNCTURE: CPT | Performed by: FAMILY MEDICINE

## 2022-05-09 PROCEDURE — 58300 INSERT INTRAUTERINE DEVICE: CPT | Performed by: FAMILY MEDICINE

## 2022-05-09 PROCEDURE — 87624 HPV HI-RISK TYP POOLED RSLT: CPT | Performed by: FAMILY MEDICINE

## 2022-05-09 RX ORDER — DOCUSATE SODIUM 100 MG/1
100 CAPSULE, LIQUID FILLED ORAL DAILY
Qty: 60 CAPSULE | Refills: 1 | Status: ON HOLD | OUTPATIENT
Start: 2022-05-09 | End: 2024-02-15

## 2022-05-09 RX ORDER — POLYETHYLENE GLYCOL 3350 17 G/17G
1 POWDER, FOR SOLUTION ORAL DAILY
Qty: 507 G | Refills: 3 | Status: ON HOLD | OUTPATIENT
Start: 2022-05-09 | End: 2024-02-15

## 2022-05-09 NOTE — PROGRESS NOTES
"ASSESSMENT/PLAN:   See was seen today for post partum exam.    Diagnoses and all orders for this visit:    Cervical cancer screening  -     Pap Screen with HPV - recommended age 30 - 65 years    Encounter for insertion of intrauterine contraceptive device  -     HCG qualitative urine; Future  -     HCG qualitative urine  -     levonorgestrel (MIRENA) 20 MCG/DAY IUD; by Intrauterine route once for 1 dose  -     INSERTION INTRAUTERINE DEVICE  -     levonorgestrel (MIRENA) 20 MCG/DAY IUD 20 mcg    Constipation, unspecified constipation type  -     polyethylene glycol (MIRALAX) 17 GM/Dose powder; Take 17 g (1 capful) by mouth daily  -     TSH with free T4 reflex; Future  -     TSH with free T4 reflex    Routine postpartum follow-up    Need for vaccination  -     COVID-19,PF,PFIZER (12+ Yrs GRAY LABEL)    Vaginal delivery  -     docusate sodium (COLACE) 100 MG capsule; Take 1 capsule (100 mg) by mouth daily    Other orders  -     Cancel: REVIEW OF HEALTH MAINTENANCE PROTOCOL ORDERS    increase water intake.        No follow-ups on file.       ======================================================    SUBJECTIVE  See Pratik is a 31 year old female here for postpartum   Encouraged regular physical exercise  Encouraged healthy diet.    Reviewed kegel exercises  Reviewed depression post partum, and when to seek help.  Reviewed that I recommend mirena IUD instead of combined contraceptives given liver issues and her h/o cancer.          ROS  Complete 10 point review of systems negative except as noted above in HPI      OBJECTIVE  BP 98/64   Pulse 56   Temp 99  F (37.2  C) (Oral)   Resp 12   Ht 1.598 m (5' 2.91\")   Wt 99.3 kg (219 lb)   LMP 05/02/2022   SpO2 99%   BMI 38.90 kg/m     Gen: no acute distress  Neck:  Supple, no lad, no thyromegaly or nodules.    Heart:  Regular rate and rhythm.  No m/r/g  Breast exam is normal  Lungs: cta bilaterally, no wheezes or rhonchi.  Good air inspiration  Abdomen:  No masses or " organomegaly  Extremities:  No edema.     Skin:  No rashes  Genitourinary Exam:   Vulva: normal skin.  No lesions noted.  Nontender.    Urethral meatus: normal size and location, no lesions or discharge   Urethra: no tenderness or masses   Bladder: no fullness or tenderness   Vagina: normal appearance, physiologic discharge. No evidence of cystocele or rectocele.   Cervix: normal appearance, no lesions, no cervical motion tenderness   Uterus: normal size and position, mobile, non-tender   Pap smear obtained: yes  Adnexa: no palpable masses bilaterally   Rectal exam: no hemorrhoids noted   Psych:  Normal.  No depression noted.      Current Outpatient Medications   Medication     docusate sodium (COLACE) 100 MG capsule     levonorgestrel (MIRENA) 20 MCG/DAY IUD     polyethylene glycol (MIRALAX) 17 GM/Dose powder     acetaminophen (TYLENOL) 325 MG tablet     ibuprofen (ADVIL/MOTRIN) 800 MG tablet     loratadine (CLARITIN) 10 MG tablet     vitamin D3 (CHOLECALCIFEROL) 10 MCG (400 UNIT) capsule     No current facility-administered medications for this visit.      Patient Active Problem List   Diagnosis     Morbid obesity (H)     Postpartum hemorrhage of vagina     Anemia     Complex ovarian cyst     S/P laparoscopy     Granulosa cell tumor     Fatty liver     Cardiomegaly     Elevated LFTs     Neoplasm of uncertain behavior of ovary     Encounter for triage in pregnant patient     Pregnancy        LABS & IMAGES   Results for orders placed or performed in visit on 05/09/22   HCG qualitative urine     Status: Normal   Result Value Ref Range    hCG Urine Qualitative Negative Negative         ======================================================    MDM          Options for treatment and follow-up care were reviewed with the patient. Mahesh Christie and/or guardian was engaged and actively involved in the decision making process. Mahesh Christie and/or guardian verbalized understanding of the options discussed and was satisfied with the  "final plan.      Vanessa Fregoso MD        IUD Insertion:  CONSULT:    Is a pregnancy test required: negative pregnancy test   Was a consent obtained?  Yes    Subjective: See Pratik is a 31 year old  presents for IUD and desires Mirena type IUD.    Patient has been given the opportunity to ask questions about all forms of birth control, including all options appropriate for See Pratik. Discussed that no method of birth control, except abstinence is 100% effective against pregnancy or sexually transmitted infection.     See Pratik understands she may have the IUD removed at any time. IUD should be removed by a health care provider.    The entire insertion procedure was reviewed with the patient, including care after placement.    Patient's last menstrual period was 2022. Last sexual activity: prior to delivery. No allergy to betadine or shellfish.   hCG Urine Qualitative   Date Value Ref Range Status   2022 Negative Negative Final     Comment:     This test is for screening purposes.  Results should be interpreted along with the clinical picture.  Confirmation testing is available if warranted by ordering [id number], HCG Quantitative Pregnancy.         BP 98/64   Pulse 56   Temp 99  F (37.2  C) (Oral)   Resp 12   Ht 1.598 m (5' 2.91\")   Wt 99.3 kg (219 lb)   LMP 2022   SpO2 99%   BMI 38.90 kg/m      Pelvic Exam:   EG/BUS: normal genital architecture without lesions, erythema or abnormal secretions.   Vagina: moist, pink, rugae with physiologic discharge and secretions  Cervix: nulli parous no lesions and pink, moist, closed, without lesion or CMT  Uterus: posterior position, mobile, no pain  Adnexa: within normal limits and no masses, nodularity, tenderness    PROCEDURE NOTE: -- IUD Insertion    Reason for Insertion: contraception    Premedicated with ibuprofen.  Under sterile technique, cervix was visualized with speculum and prepped with Betadine solution swab x 3.  The uterus was gently " straightened and sounded to 8.0 cm. IUD prepared for placement, and IUD inserted according to 's instructions without difficulty or significant resitance, and deployed at the fundus. The strings were visualized and trimmed to 2.0 cm from the external os.  Speculum removed.  Patient tolerated procedure well.    Lot # [id number]   Exp: feb 2024    EBL: minimal    Complications: none    ASSESSMENT:     ICD-10-CM    1. Cervical cancer screening  Z12.4 Pap Screen with HPV - recommended age 30 - 65 years   2. Encounter for insertion of intrauterine contraceptive device  Z30.430 HCG qualitative urine     HCG qualitative urine     levonorgestrel (MIRENA) 20 MCG/DAY IUD     INSERTION INTRAUTERINE DEVICE     levonorgestrel (MIRENA) 20 MCG/DAY IUD 20 mcg   3. Constipation, unspecified constipation type  K59.00 polyethylene glycol (MIRALAX) 17 GM/Dose powder     TSH with free T4 reflex   4. Routine postpartum follow-up  Z39.2    5. Need for vaccination  Z23 COVID-19,PF,PFIZER (12+ Yrs GRAY LABEL)   6. Vaginal delivery  O80 docusate sodium (COLACE) 100 MG capsule        PLAN:    Given 's handouts, including when to have IUD removed, list of danger s/sx, side effects and follow up recommended. Encouraged condom use for prevention of STD. Back up contraception advised for 7 days if progestin method. Advised to call for any fever, for prolonged or severe pain or bleeding, abnormal vaginal discharge, or unable to palpate strings. She was advised to use pain medications (ibuprofen) as needed for mild to moderate pain. Advised to follow-up in clinic in 4-6 weeks for IUD string check if unable to find strings or as directed by provider.     Vanessa Fregoso MD

## 2022-05-11 LAB
HUMAN PAPILLOMA VIRUS 16 DNA: NEGATIVE
HUMAN PAPILLOMA VIRUS 18 DNA: NEGATIVE
HUMAN PAPILLOMA VIRUS FINAL DIAGNOSIS: NORMAL
HUMAN PAPILLOMA VIRUS OTHER HR: NEGATIVE

## 2022-05-13 LAB
BKR LAB AP GYN ADEQUACY: NORMAL
BKR LAB AP GYN INTERPRETATION: NORMAL
BKR LAB AP HPV REFLEX: NORMAL
BKR LAB AP LMP: NORMAL
BKR LAB AP PREVIOUS ABNORMAL: NORMAL
PATH REPORT.COMMENTS IMP SPEC: NORMAL
PATH REPORT.COMMENTS IMP SPEC: NORMAL
PATH REPORT.RELEVANT HX SPEC: NORMAL

## 2022-05-26 ENCOUNTER — MEDICAL CORRESPONDENCE (OUTPATIENT)
Dept: HEALTH INFORMATION MANAGEMENT | Facility: CLINIC | Age: 32
End: 2022-05-26
Payer: COMMERCIAL

## 2022-06-13 ENCOUNTER — OFFICE VISIT (OUTPATIENT)
Dept: FAMILY MEDICINE | Facility: CLINIC | Age: 32
End: 2022-06-13
Payer: COMMERCIAL

## 2022-06-13 VITALS
RESPIRATION RATE: 16 BRPM | SYSTOLIC BLOOD PRESSURE: 100 MMHG | TEMPERATURE: 98 F | BODY MASS INDEX: 39.55 KG/M2 | HEART RATE: 79 BPM | OXYGEN SATURATION: 98 % | HEIGHT: 63 IN | WEIGHT: 223.25 LBS | DIASTOLIC BLOOD PRESSURE: 68 MMHG

## 2022-06-13 DIAGNOSIS — Z30.431 IUD CHECK UP: Primary | ICD-10-CM

## 2022-06-13 LAB
CLUE CELLS: ABNORMAL
TRICHOMONAS, WET PREP: ABNORMAL
WBC'S/HIGH POWER FIELD, WET PREP: ABNORMAL
YEAST, WET PREP: ABNORMAL

## 2022-06-13 PROCEDURE — 87210 SMEAR WET MOUNT SALINE/INK: CPT | Performed by: FAMILY MEDICINE

## 2022-06-13 PROCEDURE — 99213 OFFICE O/P EST LOW 20 MIN: CPT | Performed by: FAMILY MEDICINE

## 2022-06-13 NOTE — PROGRESS NOTES
"ASSESSMENT/PLAN:   See was seen today for follow up .    Diagnoses and all orders for this visit:    IUD check up  -     Wet prep - Clinic Collect    reviewed signs for which she should be seen.    Reviewed that she will need to pick a new provider here.        No follow-ups on file.       ======================================================    SUBJECTIVE  See Pratik is a 31 year old female here for iud check.  No problems.  Has been having some spotting for the past 2 weeks with her menses.  Had a few cramps.    Is otherwise doing well . She is enjoying being at home with her kids .     ROS  Complete 10 point review of systems negative except as noted above in HPI      OBJECTIVE  /68 (BP Location: Right arm, Patient Position: Sitting, Cuff Size: Adult Large)   Pulse 79   Temp 98  F (36.7  C) (Oral)   Resp 16   Ht 1.598 m (5' 2.91\")   Wt 101.3 kg (223 lb 4 oz)   LMP 05/02/2022   SpO2 98%   BMI 39.66 kg/m     Gen:  Nad, alert  Gu:  Mild bleeding.  IUD strings visible at cervical os.  Wet prep collected today.  iud appears to be in good position.    Current Outpatient Medications   Medication     acetaminophen (TYLENOL) 325 MG tablet     docusate sodium (COLACE) 100 MG capsule     ibuprofen (ADVIL/MOTRIN) 800 MG tablet     levonorgestrel (MIRENA) 20 MCG/DAY IUD     loratadine (CLARITIN) 10 MG tablet     polyethylene glycol (MIRALAX) 17 GM/Dose powder     vitamin D3 (CHOLECALCIFEROL) 10 MCG (400 UNIT) capsule     No current facility-administered medications for this visit.      Patient Active Problem List   Diagnosis     Morbid obesity (H)     Postpartum hemorrhage of vagina     Anemia     Complex ovarian cyst     S/P laparoscopy     Granulosa cell tumor     Fatty liver     Cardiomegaly     Elevated LFTs     Neoplasm of uncertain behavior of ovary     Encounter for triage in pregnant patient     Pregnancy        LABS & IMAGES   No results found for any visits on " 06/13/22.      ======================================================    MDM          Options for treatment and follow-up care were reviewed with the patient. Mahesh Christie and/or guardian was engaged and actively involved in the decision making process. Mahesh Christie and/or guardian verbalized understanding of the options discussed and was satisfied with the final plan.      Vanessa Fregoso MD        Answers for HPI/ROS submitted by the patient on 6/13/2022  What is the reason for your visit today? : IUD check  How many servings of fruits and vegetables do you eat daily?: 0-1  On average, how many sweetened beverages do you drink each day (Examples: soda, juice, sweet tea, etc.  Do NOT count diet or artificially sweetened beverages)?: 0  How many minutes a day do you exercise enough to make your heart beat faster?: 30 to 60  How many days a week do you exercise enough to make your heart beat faster?: 7  How many days per week do you miss taking your medication?: 0

## 2022-07-06 ENCOUNTER — TRANSFERRED RECORDS (OUTPATIENT)
Dept: HEALTH INFORMATION MANAGEMENT | Facility: CLINIC | Age: 32
End: 2022-07-06

## 2022-10-02 ENCOUNTER — HEALTH MAINTENANCE LETTER (OUTPATIENT)
Age: 32
End: 2022-10-02

## 2022-10-25 ENCOUNTER — MEDICAL CORRESPONDENCE (OUTPATIENT)
Dept: HEALTH INFORMATION MANAGEMENT | Facility: CLINIC | Age: 32
End: 2022-10-25

## 2023-02-09 ENCOUNTER — HOSPITAL ENCOUNTER (OUTPATIENT)
Dept: ULTRASOUND IMAGING | Facility: HOSPITAL | Age: 33
Discharge: HOME | End: 2023-02-09
Attending: OBSTETRICS & GYNECOLOGY | Admitting: OBSTETRICS & GYNECOLOGY
Payer: COMMERCIAL

## 2023-02-09 DIAGNOSIS — R19.07 GENERALIZED INTRA-ABDOMINAL AND PELVIC SWELLING, MASS AND LUMP: ICD-10-CM

## 2023-02-09 PROCEDURE — 76830 TRANSVAGINAL US NON-OB: CPT

## 2023-02-16 ENCOUNTER — TRANSFERRED RECORDS (OUTPATIENT)
Dept: HEALTH INFORMATION MANAGEMENT | Facility: CLINIC | Age: 33
End: 2023-02-16
Payer: COMMERCIAL

## 2023-04-04 ENCOUNTER — HOSPITAL ENCOUNTER (OUTPATIENT)
Dept: ULTRASOUND IMAGING | Facility: HOSPITAL | Age: 33
Discharge: HOME | End: 2023-04-04
Attending: OBSTETRICS & GYNECOLOGY | Admitting: OBSTETRICS & GYNECOLOGY
Payer: COMMERCIAL

## 2023-04-04 DIAGNOSIS — R19.07 GENERALIZED ABDOMINAL OR PELVIC SWELLING OR MASS OR LUMP: ICD-10-CM

## 2023-04-04 DIAGNOSIS — C56.1 MALIGNANT NEOPLASM OF RIGHT OVARY (H): ICD-10-CM

## 2023-04-04 PROCEDURE — 76830 TRANSVAGINAL US NON-OB: CPT

## 2023-04-13 ENCOUNTER — LAB REQUISITION (OUTPATIENT)
Dept: LAB | Facility: CLINIC | Age: 33
End: 2023-04-13

## 2023-04-13 DIAGNOSIS — K75.81 NONALCOHOLIC STEATOHEPATITIS (NASH): ICD-10-CM

## 2023-04-13 PROCEDURE — 84443 ASSAY THYROID STIM HORMONE: CPT | Performed by: FAMILY MEDICINE

## 2023-04-13 PROCEDURE — 80053 COMPREHEN METABOLIC PANEL: CPT | Performed by: FAMILY MEDICINE

## 2023-04-14 LAB
ALBUMIN SERPL BCG-MCNC: 4.5 G/DL (ref 3.5–5.2)
ALP SERPL-CCNC: 49 U/L (ref 35–104)
ALT SERPL W P-5'-P-CCNC: 45 U/L (ref 10–35)
ANION GAP SERPL CALCULATED.3IONS-SCNC: 14 MMOL/L (ref 7–15)
AST SERPL W P-5'-P-CCNC: 33 U/L (ref 10–35)
BILIRUB SERPL-MCNC: 1.4 MG/DL
BUN SERPL-MCNC: 10.9 MG/DL (ref 6–20)
CALCIUM SERPL-MCNC: 9.6 MG/DL (ref 8.6–10)
CHLORIDE SERPL-SCNC: 102 MMOL/L (ref 98–107)
CREAT SERPL-MCNC: 0.93 MG/DL (ref 0.51–0.95)
DEPRECATED HCO3 PLAS-SCNC: 24 MMOL/L (ref 22–29)
GFR SERPL CREATININE-BSD FRML MDRD: 84 ML/MIN/1.73M2
GLUCOSE SERPL-MCNC: 92 MG/DL (ref 70–99)
POTASSIUM SERPL-SCNC: 3.8 MMOL/L (ref 3.4–5.3)
PROT SERPL-MCNC: 7.1 G/DL (ref 6.4–8.3)
SODIUM SERPL-SCNC: 140 MMOL/L (ref 136–145)
TSH SERPL DL<=0.005 MIU/L-ACNC: 2.12 UIU/ML (ref 0.3–4.2)

## 2023-05-20 ENCOUNTER — HEALTH MAINTENANCE LETTER (OUTPATIENT)
Age: 33
End: 2023-05-20

## 2023-06-26 ENCOUNTER — LAB REQUISITION (OUTPATIENT)
Dept: LAB | Facility: CLINIC | Age: 33
End: 2023-06-26

## 2023-06-26 ENCOUNTER — TRANSFERRED RECORDS (OUTPATIENT)
Dept: HEALTH INFORMATION MANAGEMENT | Facility: CLINIC | Age: 33
End: 2023-06-26

## 2023-06-26 DIAGNOSIS — Z32.01 ENCOUNTER FOR PREGNANCY TEST, RESULT POSITIVE: ICD-10-CM

## 2023-06-26 LAB
ABO/RH(D): NORMAL
ANTIBODY SCREEN: NEGATIVE
BASOPHILS # BLD AUTO: 0.1 10E3/UL (ref 0–0.2)
BASOPHILS NFR BLD AUTO: 1 %
EOSINOPHIL # BLD AUTO: 0.1 10E3/UL (ref 0–0.7)
EOSINOPHIL NFR BLD AUTO: 1 %
ERYTHROCYTE [DISTWIDTH] IN BLOOD BY AUTOMATED COUNT: 13.4 % (ref 10–15)
HBV SURFACE AG SERPL QL IA: NONREACTIVE
HCT VFR BLD AUTO: 40.7 % (ref 35–47)
HCV AB SERPL QL IA: NONREACTIVE
HGB BLD-MCNC: 13.4 G/DL (ref 11.7–15.7)
HIV 1+2 AB+HIV1 P24 AG SERPL QL IA: NONREACTIVE
IMM GRANULOCYTES # BLD: 0 10E3/UL
IMM GRANULOCYTES NFR BLD: 1 %
LYMPHOCYTES # BLD AUTO: 2.2 10E3/UL (ref 0.8–5.3)
LYMPHOCYTES NFR BLD AUTO: 26 %
MCH RBC QN AUTO: 31.5 PG (ref 26.5–33)
MCHC RBC AUTO-ENTMCNC: 32.9 G/DL (ref 31.5–36.5)
MCV RBC AUTO: 96 FL (ref 78–100)
MONOCYTES # BLD AUTO: 0.5 10E3/UL (ref 0–1.3)
MONOCYTES NFR BLD AUTO: 5 %
NEUTROPHILS # BLD AUTO: 5.8 10E3/UL (ref 1.6–8.3)
NEUTROPHILS NFR BLD AUTO: 66 %
NRBC # BLD AUTO: 0 10E3/UL
NRBC BLD AUTO-RTO: 0 /100
PLATELET # BLD AUTO: 254 10E3/UL (ref 150–450)
RBC # BLD AUTO: 4.25 10E6/UL (ref 3.8–5.2)
RUBV IGG SERPL QL IA: 1.51 INDEX
RUBV IGG SERPL QL IA: POSITIVE
SPECIMEN EXPIRATION DATE: NORMAL
T PALLIDUM AB SER QL: NONREACTIVE
WBC # BLD AUTO: 8.7 10E3/UL (ref 4–11)

## 2023-06-26 PROCEDURE — 86780 TREPONEMA PALLIDUM: CPT | Performed by: FAMILY MEDICINE

## 2023-06-26 PROCEDURE — 87591 N.GONORRHOEAE DNA AMP PROB: CPT | Performed by: FAMILY MEDICINE

## 2023-06-26 PROCEDURE — 87389 HIV-1 AG W/HIV-1&-2 AB AG IA: CPT | Performed by: FAMILY MEDICINE

## 2023-06-26 PROCEDURE — 87086 URINE CULTURE/COLONY COUNT: CPT | Performed by: FAMILY MEDICINE

## 2023-06-26 PROCEDURE — 86901 BLOOD TYPING SEROLOGIC RH(D): CPT | Performed by: FAMILY MEDICINE

## 2023-06-26 PROCEDURE — 85025 COMPLETE CBC W/AUTO DIFF WBC: CPT | Performed by: FAMILY MEDICINE

## 2023-06-26 PROCEDURE — 86762 RUBELLA ANTIBODY: CPT | Performed by: FAMILY MEDICINE

## 2023-06-26 PROCEDURE — 86850 RBC ANTIBODY SCREEN: CPT | Performed by: FAMILY MEDICINE

## 2023-06-26 PROCEDURE — 87491 CHLMYD TRACH DNA AMP PROBE: CPT | Performed by: FAMILY MEDICINE

## 2023-06-26 PROCEDURE — 87340 HEPATITIS B SURFACE AG IA: CPT | Performed by: FAMILY MEDICINE

## 2023-06-26 PROCEDURE — 86803 HEPATITIS C AB TEST: CPT | Performed by: FAMILY MEDICINE

## 2023-06-27 LAB
C TRACH DNA SPEC QL PROBE+SIG AMP: NEGATIVE
N GONORRHOEA DNA SPEC QL NAA+PROBE: NEGATIVE

## 2023-06-29 ENCOUNTER — LAB REQUISITION (OUTPATIENT)
Dept: LAB | Facility: CLINIC | Age: 33
End: 2023-06-29

## 2023-06-29 DIAGNOSIS — K75.81 NONALCOHOLIC STEATOHEPATITIS (NASH): ICD-10-CM

## 2023-06-29 LAB — BACTERIA UR CULT: NORMAL

## 2023-06-29 PROCEDURE — 80053 COMPREHEN METABOLIC PANEL: CPT | Performed by: FAMILY MEDICINE

## 2023-06-30 LAB
ALBUMIN SERPL BCG-MCNC: 4.5 G/DL (ref 3.5–5.2)
ALP SERPL-CCNC: 57 U/L (ref 35–104)
ALT SERPL W P-5'-P-CCNC: 21 U/L (ref 0–50)
ANION GAP SERPL CALCULATED.3IONS-SCNC: 12 MMOL/L (ref 7–15)
AST SERPL W P-5'-P-CCNC: 17 U/L (ref 0–45)
BILIRUB SERPL-MCNC: 1.2 MG/DL
BUN SERPL-MCNC: 9 MG/DL (ref 6–20)
CALCIUM SERPL-MCNC: 9.6 MG/DL (ref 8.6–10)
CHLORIDE SERPL-SCNC: 99 MMOL/L (ref 98–107)
CREAT SERPL-MCNC: 0.63 MG/DL (ref 0.51–0.95)
DEPRECATED HCO3 PLAS-SCNC: 24 MMOL/L (ref 22–29)
GFR SERPL CREATININE-BSD FRML MDRD: >90 ML/MIN/1.73M2
GLUCOSE SERPL-MCNC: 76 MG/DL (ref 70–99)
POTASSIUM SERPL-SCNC: 4.2 MMOL/L (ref 3.4–5.3)
PROT SERPL-MCNC: 7.4 G/DL (ref 6.4–8.3)
SODIUM SERPL-SCNC: 135 MMOL/L (ref 136–145)

## 2023-09-22 ENCOUNTER — LAB REQUISITION (OUTPATIENT)
Dept: LAB | Facility: CLINIC | Age: 33
End: 2023-09-22

## 2023-09-22 DIAGNOSIS — L65.9 NONSCARRING HAIR LOSS, UNSPECIFIED: ICD-10-CM

## 2023-09-22 LAB — TSH SERPL DL<=0.005 MIU/L-ACNC: 1.05 UIU/ML (ref 0.3–4.2)

## 2023-09-22 PROCEDURE — 84443 ASSAY THYROID STIM HORMONE: CPT | Performed by: FAMILY MEDICINE

## 2023-11-17 ENCOUNTER — LAB REQUISITION (OUTPATIENT)
Dept: LAB | Facility: CLINIC | Age: 33
End: 2023-11-17

## 2023-11-17 DIAGNOSIS — R10.11 RIGHT UPPER QUADRANT PAIN: ICD-10-CM

## 2023-11-17 LAB
ALBUMIN SERPL BCG-MCNC: 3.8 G/DL (ref 3.5–5.2)
ALP SERPL-CCNC: 62 U/L (ref 40–150)
ALT SERPL W P-5'-P-CCNC: 10 U/L (ref 0–50)
AST SERPL W P-5'-P-CCNC: 15 U/L (ref 0–45)
BILIRUB DIRECT SERPL-MCNC: <0.2 MG/DL (ref 0–0.3)
BILIRUB SERPL-MCNC: 0.7 MG/DL
PROT SERPL-MCNC: 7 G/DL (ref 6.4–8.3)

## 2023-11-17 PROCEDURE — 80076 HEPATIC FUNCTION PANEL: CPT | Performed by: FAMILY MEDICINE

## 2024-01-12 ENCOUNTER — TRANSFERRED RECORDS (OUTPATIENT)
Dept: HEALTH INFORMATION MANAGEMENT | Facility: CLINIC | Age: 34
End: 2024-01-12
Payer: COMMERCIAL

## 2024-01-12 ENCOUNTER — MEDICAL CORRESPONDENCE (OUTPATIENT)
Dept: HEALTH INFORMATION MANAGEMENT | Facility: CLINIC | Age: 34
End: 2024-01-12
Payer: COMMERCIAL

## 2024-01-15 ENCOUNTER — TRANSCRIBE ORDERS (OUTPATIENT)
Dept: MATERNAL FETAL MEDICINE | Facility: HOSPITAL | Age: 34
End: 2024-01-15
Payer: COMMERCIAL

## 2024-01-15 ENCOUNTER — PRE VISIT (OUTPATIENT)
Dept: MATERNAL FETAL MEDICINE | Facility: HOSPITAL | Age: 34
End: 2024-01-15
Payer: COMMERCIAL

## 2024-01-15 DIAGNOSIS — O26.90 PREGNANCY RELATED CONDITION, ANTEPARTUM: Primary | ICD-10-CM

## 2024-01-18 ENCOUNTER — OFFICE VISIT (OUTPATIENT)
Dept: MATERNAL FETAL MEDICINE | Facility: HOSPITAL | Age: 34
End: 2024-01-18
Attending: FAMILY MEDICINE
Payer: COMMERCIAL

## 2024-01-18 ENCOUNTER — ANCILLARY PROCEDURE (OUTPATIENT)
Dept: ULTRASOUND IMAGING | Facility: HOSPITAL | Age: 34
End: 2024-01-18
Attending: FAMILY MEDICINE
Payer: COMMERCIAL

## 2024-01-18 DIAGNOSIS — O99.213 OBESITY AFFECTING PREGNANCY IN THIRD TRIMESTER, UNSPECIFIED OBESITY TYPE: Primary | ICD-10-CM

## 2024-01-18 DIAGNOSIS — O26.90 PREGNANCY RELATED CONDITION, ANTEPARTUM: ICD-10-CM

## 2024-01-18 PROCEDURE — 99207 PR NO CHARGE LOS: CPT | Performed by: OBSTETRICS & GYNECOLOGY

## 2024-01-18 PROCEDURE — 76819 FETAL BIOPHYS PROFIL W/O NST: CPT | Mod: 26 | Performed by: OBSTETRICS & GYNECOLOGY

## 2024-01-18 PROCEDURE — 76819 FETAL BIOPHYS PROFIL W/O NST: CPT

## 2024-01-18 NOTE — NURSING NOTE
See Pratik is a  at 35w1d who presents to [facility] for a BPP. Pt reports positive fetal movement. Pt denies bldg/lof/change in discharge, contractions, headache, vision changes, chest pain/SOB or edema. SBAR given to Dr. Antunez, see note in Epic.      Leeann Sullivan RN

## 2024-01-18 NOTE — PROGRESS NOTES
Please see the imaging tab for details of the ultrasound performed today.    Adrianne Antunez MD  Specialist in Maternal-Fetal Medicine

## 2024-01-25 ENCOUNTER — HOSPITAL ENCOUNTER (OUTPATIENT)
Dept: ULTRASOUND IMAGING | Facility: CLINIC | Age: 34
Discharge: HOME | End: 2024-01-25
Attending: OBSTETRICS & GYNECOLOGY
Payer: COMMERCIAL

## 2024-01-25 ENCOUNTER — OFFICE VISIT (OUTPATIENT)
Dept: MATERNAL FETAL MEDICINE | Facility: CLINIC | Age: 34
End: 2024-01-25
Attending: OBSTETRICS & GYNECOLOGY
Payer: COMMERCIAL

## 2024-01-25 DIAGNOSIS — O99.213 OBESITY AFFECTING PREGNANCY IN THIRD TRIMESTER, UNSPECIFIED OBESITY TYPE: ICD-10-CM

## 2024-01-25 DIAGNOSIS — O35.9XX0 FETAL ABNORMALITY AFFECTING MANAGEMENT OF MOTHER, ANTEPARTUM, SINGLE OR UNSPECIFIED FETUS: Primary | ICD-10-CM

## 2024-01-25 PROCEDURE — 76819 FETAL BIOPHYS PROFIL W/O NST: CPT

## 2024-01-25 PROCEDURE — 76811 OB US DETAILED SNGL FETUS: CPT | Mod: 26 | Performed by: OBSTETRICS & GYNECOLOGY

## 2024-01-25 PROCEDURE — 99203 OFFICE O/P NEW LOW 30 MIN: CPT | Mod: 25 | Performed by: OBSTETRICS & GYNECOLOGY

## 2024-01-25 PROCEDURE — 76819 FETAL BIOPHYS PROFIL W/O NST: CPT | Mod: 26 | Performed by: OBSTETRICS & GYNECOLOGY

## 2024-01-25 NOTE — PROGRESS NOTES
Please see full imaging report from View[employer] program under imaging tab.    Thank-you for referring your patient for a comprehensive ultrasound.    I discussed the findings on today's ultrasound with the patient. I reviewed the limitations of ultrasound both in detecting aneuploidy and structural abnormalities.  Ultrasound can routinely detect 80-90% of structural abnormalities. I reviewed our concern today for the potential for bilateral club foot, but the inability to confirm this so late in the pregnancy. We are unable to access the report or imaging from her earlier US but she does not recall any abnormalities in the feet being identified.     Today on ultrasound bilateral clubfoot is suspected. We discussed that most cases of isolated club foot  are sporadic (two-thirds) and are not due to chromosomal abnormalities or genetic syndromes.  However, in cases of isolated club foot approximately 1-2% of fetuses will have a chromosomal abnormality and up to 4% will have a structural malformation detected at birth not detected on prenatal evaluation especially when identified so late in pregnancy.  We also discussed that certain structural/neuromuscular associated abnormalities are unable to be detected on prenatal ultrasound due to its limitations. Finally, we discussed that the best course of treatment and ultimate prognosis are difficult to predict prenatally but that the general approach to  management often involves serial casting and gentle manipulation. With early care outcomes are usually excellent with most children being able to wear normal shoes and lead a healthy and active life.     Ms. Christie has not had genetic screening this pregnancy. We reviewed the limitations of ultrasound both in detecting aneuploidy and structural abnormalities.       *Given her advanced gestational age, we are not able to facilitate a consultation with pediatric orthopedics at [facility] prior to delivery. We reviewed that  it is also very hard to diagnose club foot at this advanced gestational age and therefore we recommend evaluation by pediatrics at the time of delivery and coordination of pediatric orthopedics referral at that time. She plans delivery at [facility].     We will see her for weekly BPPs until delivery, which is recommended at 39 weeks, sooner if complications arise. She has gone into spontaneous labor at 39 weeks in all four prior pregnancies.     Return to primary provider for continued prenatal care.    If you have questions regarding today's evaluation or if we can be of further service, please contact the [facility].    **Fetal anomalies may be present but not detected**    I spent a total of 25 minutes on the date of this encounter including preparing to see the patient (reviewing medical records/tests), counseling and discussing the plan of care, documenting the visit in the electronic medical record, and communicating with other health care professionals and/or care coordination.    Umair Tinsley MD  Maternal Fetal Medicine       Dr. Melissa Larson ([facility])

## 2024-01-25 NOTE — NURSING NOTE
Patient presents to M for L2/BPP at 36w1d due to BMI 40. Positive fetal movement. Denies LOF, vaginal bleeding or cramping/contractions. SBAR given to GENNY MD, see their note in Epic.

## 2024-01-26 ENCOUNTER — LAB REQUISITION (OUTPATIENT)
Dept: LAB | Facility: CLINIC | Age: 34
End: 2024-01-26

## 2024-01-26 DIAGNOSIS — O09.90 SUPERVISION OF HIGH RISK PREGNANCY, UNSPECIFIED, UNSPECIFIED TRIMESTER: ICD-10-CM

## 2024-01-26 PROCEDURE — 87653 STREP B DNA AMP PROBE: CPT | Performed by: FAMILY MEDICINE

## 2024-01-27 LAB — GP B STREP DNA SPEC QL NAA+PROBE: NEGATIVE

## 2024-01-29 ENCOUNTER — TRANSFERRED RECORDS (OUTPATIENT)
Dept: HEALTH INFORMATION MANAGEMENT | Facility: CLINIC | Age: 34
End: 2024-01-29
Payer: COMMERCIAL

## 2024-02-01 ENCOUNTER — ANCILLARY PROCEDURE (OUTPATIENT)
Dept: ULTRASOUND IMAGING | Facility: HOSPITAL | Age: 34
End: 2024-02-01
Attending: OBSTETRICS & GYNECOLOGY
Payer: COMMERCIAL

## 2024-02-01 ENCOUNTER — OFFICE VISIT (OUTPATIENT)
Dept: MATERNAL FETAL MEDICINE | Facility: HOSPITAL | Age: 34
End: 2024-02-01
Attending: OBSTETRICS & GYNECOLOGY
Payer: COMMERCIAL

## 2024-02-01 DIAGNOSIS — O99.213 OBESITY AFFECTING PREGNANCY IN THIRD TRIMESTER, UNSPECIFIED OBESITY TYPE: ICD-10-CM

## 2024-02-01 DIAGNOSIS — O99.213 OBESITY AFFECTING PREGNANCY IN THIRD TRIMESTER, UNSPECIFIED OBESITY TYPE: Primary | ICD-10-CM

## 2024-02-01 PROCEDURE — 76819 FETAL BIOPHYS PROFIL W/O NST: CPT

## 2024-02-01 PROCEDURE — 76819 FETAL BIOPHYS PROFIL W/O NST: CPT | Mod: 26 | Performed by: OBSTETRICS & GYNECOLOGY

## 2024-02-01 PROCEDURE — 99207 PR NO CHARGE LOS: CPT | Performed by: OBSTETRICS & GYNECOLOGY

## 2024-02-01 NOTE — NURSING NOTE
See Pratik is a  at 37w1d who presents to [facility] for a BPP. Pt reports positive fetal movement. Pt denies bldg/lof/change in discharge, contractions, headache, vision changes, chest pain/SOB or edema. SBAR given to Dr. Antunez, see note in Epic.      Leeann Sullivan RN

## 2024-02-08 ENCOUNTER — OFFICE VISIT (OUTPATIENT)
Dept: MATERNAL FETAL MEDICINE | Facility: HOSPITAL | Age: 34
End: 2024-02-08
Attending: OBSTETRICS & GYNECOLOGY
Payer: COMMERCIAL

## 2024-02-08 ENCOUNTER — ANCILLARY PROCEDURE (OUTPATIENT)
Dept: ULTRASOUND IMAGING | Facility: HOSPITAL | Age: 34
End: 2024-02-08
Attending: OBSTETRICS & GYNECOLOGY
Payer: COMMERCIAL

## 2024-02-08 DIAGNOSIS — O99.213 OBESITY AFFECTING PREGNANCY IN THIRD TRIMESTER, UNSPECIFIED OBESITY TYPE: ICD-10-CM

## 2024-02-08 DIAGNOSIS — O99.213 OBESITY AFFECTING PREGNANCY IN THIRD TRIMESTER, UNSPECIFIED OBESITY TYPE: Primary | ICD-10-CM

## 2024-02-08 PROCEDURE — 99207 PR NO CHARGE LOS: CPT | Performed by: OBSTETRICS & GYNECOLOGY

## 2024-02-08 PROCEDURE — 76819 FETAL BIOPHYS PROFIL W/O NST: CPT | Mod: 26 | Performed by: OBSTETRICS & GYNECOLOGY

## 2024-02-08 PROCEDURE — 76819 FETAL BIOPHYS PROFIL W/O NST: CPT

## 2024-02-08 NOTE — NURSING NOTE
See Pratik is a  at 38w1d who presents to [facility] for a BPP. Pt reports positive fetal movement. Pt denies bldg/lof/change in discharge, contractions, headache, vision changes, chest pain/SOB or edema. SBAR given to Dr. Antunez, see note in Epic.       Leeann Sullivan RN

## 2024-02-14 ENCOUNTER — HOSPITAL ENCOUNTER (INPATIENT)
Facility: HOSPITAL | Age: 34
LOS: 1 days | Discharge: HOME | End: 2024-02-15
Attending: FAMILY MEDICINE | Admitting: FAMILY MEDICINE
Payer: COMMERCIAL

## 2024-02-14 ENCOUNTER — APPOINTMENT (OUTPATIENT)
Dept: CT IMAGING | Facility: HOSPITAL | Age: 34
End: 2024-02-14
Attending: FAMILY MEDICINE
Payer: COMMERCIAL

## 2024-02-14 DIAGNOSIS — H53.8 BLURRED VISION: ICD-10-CM

## 2024-02-14 DIAGNOSIS — E66.01 MORBID OBESITY (H): ICD-10-CM

## 2024-02-14 PROBLEM — Z34.90 PREGNANCY: Status: ACTIVE | Noted: 2022-03-23

## 2024-02-14 LAB
ABO/RH(D): NORMAL
ALBUMIN SERPL BCG-MCNC: 3.4 G/DL (ref 3.5–5.2)
ALP SERPL-CCNC: 182 U/L (ref 40–150)
ALT SERPL W P-5'-P-CCNC: 13 U/L (ref 0–50)
ANION GAP SERPL CALCULATED.3IONS-SCNC: 12 MMOL/L (ref 7–15)
ANTIBODY SCREEN: NEGATIVE
AST SERPL W P-5'-P-CCNC: 18 U/L (ref 0–45)
BILIRUB SERPL-MCNC: 0.7 MG/DL
BUN SERPL-MCNC: 9.1 MG/DL (ref 6–20)
CALCIUM SERPL-MCNC: 8.9 MG/DL (ref 8.6–10)
CHLORIDE SERPL-SCNC: 104 MMOL/L (ref 98–107)
CREAT SERPL-MCNC: 0.71 MG/DL (ref 0.51–0.95)
DEPRECATED HCO3 PLAS-SCNC: 22 MMOL/L (ref 22–29)
EGFRCR SERPLBLD CKD-EPI 2021: >90 ML/MIN/1.73M2
ERYTHROCYTE [DISTWIDTH] IN BLOOD BY AUTOMATED COUNT: 14 % (ref 10–15)
GLUCOSE SERPL-MCNC: 82 MG/DL (ref 70–99)
HCT VFR BLD AUTO: 37.4 % (ref 35–47)
HGB BLD-MCNC: 12.5 G/DL (ref 11.7–15.7)
HGB BLD-MCNC: 12.5 G/DL (ref 11.7–15.7)
MCH RBC QN AUTO: 31.6 PG (ref 26.5–33)
MCHC RBC AUTO-ENTMCNC: 33.4 G/DL (ref 31.5–36.5)
MCV RBC AUTO: 94 FL (ref 78–100)
PLATELET # BLD AUTO: 226 10E3/UL (ref 150–450)
POTASSIUM SERPL-SCNC: 4.1 MMOL/L (ref 3.4–5.3)
PROT SERPL-MCNC: 6.8 G/DL (ref 6.4–8.3)
RBC # BLD AUTO: 3.96 10E6/UL (ref 3.8–5.2)
SODIUM SERPL-SCNC: 138 MMOL/L (ref 135–145)
SPECIMEN EXPIRATION DATE: NORMAL
WBC # BLD AUTO: 8.8 10E3/UL (ref 4–11)

## 2024-02-14 PROCEDURE — 722N000001 HC LABOR CARE VAGINAL DELIVERY SINGLE

## 2024-02-14 PROCEDURE — 80053 COMPREHEN METABOLIC PANEL: CPT | Performed by: FAMILY MEDICINE

## 2024-02-14 PROCEDURE — 999N000248 HC STATISTIC IV INSERT WITH US BY RN

## 2024-02-14 PROCEDURE — 250N000009 HC RX 250: Performed by: FAMILY MEDICINE

## 2024-02-14 PROCEDURE — 70450 CT HEAD/BRAIN W/O DYE: CPT

## 2024-02-14 PROCEDURE — 36415 COLL VENOUS BLD VENIPUNCTURE: CPT | Performed by: FAMILY MEDICINE

## 2024-02-14 PROCEDURE — 258N000003 HC RX IP 258 OP 636: Performed by: FAMILY MEDICINE

## 2024-02-14 PROCEDURE — 250N000013 HC RX MED GY IP 250 OP 250 PS 637: Performed by: FAMILY MEDICINE

## 2024-02-14 PROCEDURE — 120N000001 HC R&B MED SURG/OB

## 2024-02-14 PROCEDURE — 86900 BLOOD TYPING SEROLOGIC ABO: CPT | Performed by: FAMILY MEDICINE

## 2024-02-14 PROCEDURE — 3E033VJ INTRODUCTION OF OTHER HORMONE INTO PERIPHERAL VEIN, PERCUTANEOUS APPROACH: ICD-10-PCS | Performed by: FAMILY MEDICINE

## 2024-02-14 PROCEDURE — 10907ZC DRAINAGE OF AMNIOTIC FLUID, THERAPEUTIC FROM PRODUCTS OF CONCEPTION, VIA NATURAL OR ARTIFICIAL OPENING: ICD-10-PCS | Performed by: FAMILY MEDICINE

## 2024-02-14 PROCEDURE — 999N000128 HC STATISTIC PERIPHERAL IV START W/O US GUIDANCE

## 2024-02-14 PROCEDURE — 86780 TREPONEMA PALLIDUM: CPT | Performed by: FAMILY MEDICINE

## 2024-02-14 PROCEDURE — 85027 COMPLETE CBC AUTOMATED: CPT | Performed by: FAMILY MEDICINE

## 2024-02-14 PROCEDURE — 85018 HEMOGLOBIN: CPT | Performed by: FAMILY MEDICINE

## 2024-02-14 RX ORDER — CITRIC ACID/SODIUM CITRATE 334-500MG
30 SOLUTION, ORAL ORAL
Status: DISCONTINUED | OUTPATIENT
Start: 2024-02-14 | End: 2024-02-14 | Stop reason: HOSPADM

## 2024-02-14 RX ORDER — CARBOPROST TROMETHAMINE 250 UG/ML
250 INJECTION, SOLUTION INTRAMUSCULAR
Status: DISCONTINUED | OUTPATIENT
Start: 2024-02-14 | End: 2024-02-15 | Stop reason: HOSPADM

## 2024-02-14 RX ORDER — METOCLOPRAMIDE HYDROCHLORIDE 5 MG/ML
10 INJECTION INTRAMUSCULAR; INTRAVENOUS EVERY 6 HOURS PRN
Status: DISCONTINUED | OUTPATIENT
Start: 2024-02-14 | End: 2024-02-14 | Stop reason: HOSPADM

## 2024-02-14 RX ORDER — SODIUM CHLORIDE, SODIUM LACTATE, POTASSIUM CHLORIDE, CALCIUM CHLORIDE 600; 310; 30; 20 MG/100ML; MG/100ML; MG/100ML; MG/100ML
10-125 INJECTION, SOLUTION INTRAVENOUS CONTINUOUS
Status: DISCONTINUED | OUTPATIENT
Start: 2024-02-14 | End: 2024-02-15 | Stop reason: HOSPADM

## 2024-02-14 RX ORDER — PROCHLORPERAZINE 25 MG
25 SUPPOSITORY, RECTAL RECTAL EVERY 12 HOURS PRN
Status: DISCONTINUED | OUTPATIENT
Start: 2024-02-14 | End: 2024-02-14 | Stop reason: HOSPADM

## 2024-02-14 RX ORDER — CARBOPROST TROMETHAMINE 250 UG/ML
250 INJECTION, SOLUTION INTRAMUSCULAR
Status: DISCONTINUED | OUTPATIENT
Start: 2024-02-14 | End: 2024-02-14 | Stop reason: HOSPADM

## 2024-02-14 RX ORDER — LOPERAMIDE HCL 2 MG
4 CAPSULE ORAL
Status: DISCONTINUED | OUTPATIENT
Start: 2024-02-14 | End: 2024-02-14 | Stop reason: HOSPADM

## 2024-02-14 RX ORDER — ONDANSETRON 2 MG/ML
4 INJECTION INTRAMUSCULAR; INTRAVENOUS EVERY 6 HOURS PRN
Status: DISCONTINUED | OUTPATIENT
Start: 2024-02-14 | End: 2024-02-14 | Stop reason: HOSPADM

## 2024-02-14 RX ORDER — BISACODYL 10 MG
10 SUPPOSITORY, RECTAL RECTAL DAILY PRN
Status: DISCONTINUED | OUTPATIENT
Start: 2024-02-14 | End: 2024-02-15 | Stop reason: HOSPADM

## 2024-02-14 RX ORDER — LIDOCAINE 40 MG/G
CREAM TOPICAL
Status: DISCONTINUED | OUTPATIENT
Start: 2024-02-14 | End: 2024-02-14 | Stop reason: HOSPADM

## 2024-02-14 RX ORDER — MODIFIED LANOLIN
OINTMENT (GRAM) TOPICAL
Status: DISCONTINUED | OUTPATIENT
Start: 2024-02-14 | End: 2024-02-15 | Stop reason: HOSPADM

## 2024-02-14 RX ORDER — HYDRALAZINE HYDROCHLORIDE 20 MG/ML
5-10 INJECTION INTRAMUSCULAR; INTRAVENOUS
Status: DISCONTINUED | OUTPATIENT
Start: 2024-02-14 | End: 2024-02-15 | Stop reason: HOSPADM

## 2024-02-14 RX ORDER — KETOROLAC TROMETHAMINE 30 MG/ML
30 INJECTION, SOLUTION INTRAMUSCULAR; INTRAVENOUS
Status: DISCONTINUED | OUTPATIENT
Start: 2024-02-14 | End: 2024-02-15 | Stop reason: HOSPADM

## 2024-02-14 RX ORDER — TRANEXAMIC ACID 10 MG/ML
1 INJECTION, SOLUTION INTRAVENOUS EVERY 30 MIN PRN
Status: DISCONTINUED | OUTPATIENT
Start: 2024-02-14 | End: 2024-02-15 | Stop reason: HOSPADM

## 2024-02-14 RX ORDER — IBUPROFEN 600 MG/1
600 TABLET, FILM COATED ORAL EVERY 6 HOURS PRN
Status: DISCONTINUED | OUTPATIENT
Start: 2024-02-14 | End: 2024-02-15 | Stop reason: HOSPADM

## 2024-02-14 RX ORDER — NALOXONE HYDROCHLORIDE 0.4 MG/ML
0.4 INJECTION, SOLUTION INTRAMUSCULAR; INTRAVENOUS; SUBCUTANEOUS
Status: DISCONTINUED | OUTPATIENT
Start: 2024-02-14 | End: 2024-02-14 | Stop reason: HOSPADM

## 2024-02-14 RX ORDER — LOPERAMIDE HCL 2 MG
2 CAPSULE ORAL
Status: DISCONTINUED | OUTPATIENT
Start: 2024-02-14 | End: 2024-02-15 | Stop reason: HOSPADM

## 2024-02-14 RX ORDER — TRANEXAMIC ACID 10 MG/ML
1 INJECTION, SOLUTION INTRAVENOUS EVERY 30 MIN PRN
Status: DISCONTINUED | OUTPATIENT
Start: 2024-02-14 | End: 2024-02-14 | Stop reason: HOSPADM

## 2024-02-14 RX ORDER — LOPERAMIDE HCL 2 MG
2 CAPSULE ORAL
Status: DISCONTINUED | OUTPATIENT
Start: 2024-02-14 | End: 2024-02-14 | Stop reason: HOSPADM

## 2024-02-14 RX ORDER — DOCUSATE SODIUM 100 MG/1
100 CAPSULE, LIQUID FILLED ORAL DAILY
Status: DISCONTINUED | OUTPATIENT
Start: 2024-02-14 | End: 2024-02-15 | Stop reason: HOSPADM

## 2024-02-14 RX ORDER — PROCHLORPERAZINE MALEATE 10 MG
10 TABLET ORAL EVERY 6 HOURS PRN
Status: DISCONTINUED | OUTPATIENT
Start: 2024-02-14 | End: 2024-02-14 | Stop reason: HOSPADM

## 2024-02-14 RX ORDER — IBUPROFEN 800 MG/1
800 TABLET, FILM COATED ORAL
Status: DISCONTINUED | OUTPATIENT
Start: 2024-02-14 | End: 2024-02-15 | Stop reason: HOSPADM

## 2024-02-14 RX ORDER — OXYTOCIN 10 [USP'U]/ML
10 INJECTION, SOLUTION INTRAMUSCULAR; INTRAVENOUS
Status: DISCONTINUED | OUTPATIENT
Start: 2024-02-14 | End: 2024-02-15 | Stop reason: HOSPADM

## 2024-02-14 RX ORDER — OXYTOCIN/0.9 % SODIUM CHLORIDE 30/500 ML
340 PLASTIC BAG, INJECTION (ML) INTRAVENOUS CONTINUOUS PRN
Status: DISCONTINUED | OUTPATIENT
Start: 2024-02-14 | End: 2024-02-15 | Stop reason: HOSPADM

## 2024-02-14 RX ORDER — ONDANSETRON 4 MG/1
4 TABLET, ORALLY DISINTEGRATING ORAL EVERY 6 HOURS PRN
Status: DISCONTINUED | OUTPATIENT
Start: 2024-02-14 | End: 2024-02-14 | Stop reason: HOSPADM

## 2024-02-14 RX ORDER — MISOPROSTOL 200 UG/1
400 TABLET ORAL
Status: DISCONTINUED | OUTPATIENT
Start: 2024-02-14 | End: 2024-02-15 | Stop reason: HOSPADM

## 2024-02-14 RX ORDER — METOCLOPRAMIDE 10 MG/1
10 TABLET ORAL EVERY 6 HOURS PRN
Status: DISCONTINUED | OUTPATIENT
Start: 2024-02-14 | End: 2024-02-14 | Stop reason: HOSPADM

## 2024-02-14 RX ORDER — OXYTOCIN/0.9 % SODIUM CHLORIDE 30/500 ML
100-340 PLASTIC BAG, INJECTION (ML) INTRAVENOUS CONTINUOUS PRN
Status: DISCONTINUED | OUTPATIENT
Start: 2024-02-14 | End: 2024-02-15 | Stop reason: HOSPADM

## 2024-02-14 RX ORDER — METHYLERGONOVINE MALEATE 0.2 MG/ML
200 INJECTION INTRAVENOUS
Status: DISCONTINUED | OUTPATIENT
Start: 2024-02-14 | End: 2024-02-15 | Stop reason: HOSPADM

## 2024-02-14 RX ORDER — MISOPROSTOL 200 UG/1
400 TABLET ORAL
Status: DISCONTINUED | OUTPATIENT
Start: 2024-02-14 | End: 2024-02-14 | Stop reason: HOSPADM

## 2024-02-14 RX ORDER — MISOPROSTOL 200 UG/1
800 TABLET ORAL
Status: DISCONTINUED | OUTPATIENT
Start: 2024-02-14 | End: 2024-02-15 | Stop reason: HOSPADM

## 2024-02-14 RX ORDER — OXYTOCIN 10 [USP'U]/ML
10 INJECTION, SOLUTION INTRAMUSCULAR; INTRAVENOUS
Status: DISCONTINUED | OUTPATIENT
Start: 2024-02-14 | End: 2024-02-14 | Stop reason: HOSPADM

## 2024-02-14 RX ORDER — MISOPROSTOL 200 UG/1
800 TABLET ORAL
Status: DISCONTINUED | OUTPATIENT
Start: 2024-02-14 | End: 2024-02-14 | Stop reason: HOSPADM

## 2024-02-14 RX ORDER — ACETAMINOPHEN 325 MG/1
650 TABLET ORAL EVERY 4 HOURS PRN
Status: DISCONTINUED | OUTPATIENT
Start: 2024-02-14 | End: 2024-02-15 | Stop reason: HOSPADM

## 2024-02-14 RX ORDER — METHYLERGONOVINE MALEATE 0.2 MG/ML
200 INJECTION INTRAVENOUS
Status: DISCONTINUED | OUTPATIENT
Start: 2024-02-14 | End: 2024-02-14 | Stop reason: HOSPADM

## 2024-02-14 RX ORDER — HYDROCORTISONE 25 MG/G
CREAM TOPICAL 3 TIMES DAILY PRN
Status: DISCONTINUED | OUTPATIENT
Start: 2024-02-14 | End: 2024-02-15 | Stop reason: HOSPADM

## 2024-02-14 RX ORDER — LIDOCAINE 40 MG/G
CREAM TOPICAL
Status: DISCONTINUED | OUTPATIENT
Start: 2024-02-14 | End: 2024-02-15 | Stop reason: HOSPADM

## 2024-02-14 RX ORDER — LOPERAMIDE HCL 2 MG
4 CAPSULE ORAL
Status: DISCONTINUED | OUTPATIENT
Start: 2024-02-14 | End: 2024-02-15 | Stop reason: HOSPADM

## 2024-02-14 RX ORDER — NALOXONE HYDROCHLORIDE 0.4 MG/ML
0.2 INJECTION, SOLUTION INTRAMUSCULAR; INTRAVENOUS; SUBCUTANEOUS
Status: DISCONTINUED | OUTPATIENT
Start: 2024-02-14 | End: 2024-02-14 | Stop reason: HOSPADM

## 2024-02-14 RX ORDER — OXYTOCIN/0.9 % SODIUM CHLORIDE 30/500 ML
1-24 PLASTIC BAG, INJECTION (ML) INTRAVENOUS CONTINUOUS
Status: DISCONTINUED | OUTPATIENT
Start: 2024-02-14 | End: 2024-02-14 | Stop reason: HOSPADM

## 2024-02-14 RX ORDER — TERBUTALINE SULFATE 1 MG/ML
0.25 INJECTION, SOLUTION SUBCUTANEOUS
Status: DISCONTINUED | OUTPATIENT
Start: 2024-02-14 | End: 2024-02-14 | Stop reason: HOSPADM

## 2024-02-14 RX ORDER — OXYCODONE HYDROCHLORIDE 5 MG/1
5 TABLET ORAL
Status: COMPLETED | OUTPATIENT
Start: 2024-02-14 | End: 2024-02-14

## 2024-02-14 RX ORDER — SODIUM CHLORIDE, SODIUM LACTATE, POTASSIUM CHLORIDE, CALCIUM CHLORIDE 600; 310; 30; 20 MG/100ML; MG/100ML; MG/100ML; MG/100ML
INJECTION, SOLUTION INTRAVENOUS CONTINUOUS PRN
Status: DISCONTINUED | OUTPATIENT
Start: 2024-02-14 | End: 2024-02-14 | Stop reason: HOSPADM

## 2024-02-14 RX ORDER — OXYTOCIN/0.9 % SODIUM CHLORIDE 30/500 ML
340 PLASTIC BAG, INJECTION (ML) INTRAVENOUS CONTINUOUS PRN
Status: DISCONTINUED | OUTPATIENT
Start: 2024-02-14 | End: 2024-02-14 | Stop reason: HOSPADM

## 2024-02-14 RX ADMIN — MISOPROSTOL 800 MCG: 200 TABLET ORAL at 17:55

## 2024-02-14 RX ADMIN — DOCUSATE SODIUM 100 MG: 100 CAPSULE, LIQUID FILLED ORAL at 18:32

## 2024-02-14 RX ADMIN — TRANEXAMIC ACID 1 G: 10 INJECTION, SOLUTION INTRAVENOUS at 17:12

## 2024-02-14 RX ADMIN — SODIUM CHLORIDE, POTASSIUM CHLORIDE, SODIUM LACTATE AND CALCIUM CHLORIDE: 600; 310; 30; 20 INJECTION, SOLUTION INTRAVENOUS at 09:37

## 2024-02-14 RX ADMIN — Medication 2 MILLI-UNITS/MIN: at 09:38

## 2024-02-14 RX ADMIN — OXYCODONE HYDROCHLORIDE 5 MG: 5 TABLET ORAL at 22:13

## 2024-02-14 RX ADMIN — IBUPROFEN 800 MG: 800 TABLET ORAL at 20:39

## 2024-02-14 RX ADMIN — ACETAMINOPHEN 650 MG: 325 TABLET ORAL at 18:31

## 2024-02-14 ASSESSMENT — ACTIVITIES OF DAILY LIVING (ADL)
ADLS_ACUITY_SCORE: 20

## 2024-02-14 NOTE — PROGRESS NOTES
"Pt states her vision \"is getting maybe a little bit better\" . Writer and Dr. Fregoso remain bedside.   "

## 2024-02-14 NOTE — PROVIDER NOTIFICATION
Dr. Fregoso called for update, notified ctx approximately 2-3 min apart, pitocin at 10 mU, , moderate variability, accels present; BP at 1223 was 130/82, temp 98.5; See reporting she is just starting to feel a little uncomfortable with ctx - continuing to monitor. Will continue to keep Dr. Fregoso informed on patient care.

## 2024-02-14 NOTE — PROVIDER NOTIFICATION
"Dr. Fregoso to bedside for evaluation for pt reporting blurry vision, pt states \"headache is better\", Dr. Fregoso performing neuro exam.   "

## 2024-02-14 NOTE — PROGRESS NOTES
Notified Dr. Fregoso SVE 5.5/50/-2.  Patient does not feel urge to push but has rectal pressure.  Dr Fregoso is coming to assess patient.

## 2024-02-14 NOTE — H&P
Maternal Admission H&P  [facility]  Date of Admission: 2024  Date of Service: 2024    Name      See Pratik WICK       1990  MRN       [id number]  PCP        Vanessa Fregoso at [facility] [phone].    ________________________________________________________________________    Assessment and Plan:  33 year old  at 39w0d.  Baby LGA. Anticipate .  Will be ready for possible dystocia  Group B strep: negative  ALBARRAN, followed by MFM, saw GI this pregnancy has had normal LFT's.   Morbid obesity  History of ovarian cancer, s/p tx, remission.  Followed by gyn onc.    Here for induction of labor, plan for pitocin.    History of PPH, will give TXA at 8cm.    Possible bilateral club foot noted on MFM late US, normal 20 week ultrasound  Will notify the team.   ________________________________________________________________________    Chief Complaint: induction of labor, indication morbid obesity, ALBARRAN, possible LGA baby. .    HPI: See Pratik is a 33 year old woman at 39w0d, based on an Estimated Date of Delivery: 2024. She presents with plan for induction at 39 weeks, as recommended by MFM.    Pregnancy complicated by morbid obesity, ALBARRAN, history of ovarian cancer, history of PPH, possible LGA baby.      Patient Active Problem List    Diagnosis Date Noted    Obesity affecting pregnancy in third trimester 2024     Priority: Medium    Pregnancy 2022     Priority: Medium    Encounter for triage in pregnant patient 2022     Priority: Medium    Neoplasm of uncertain behavior of ovary 2022     Priority: Medium    Fatty liver 2020     Priority: Medium     Severe.          Cardiomegaly 2020     Priority: Medium     Noted incidentally on imaging.          Elevated LFTs 2020     Priority: Medium     Thought to be due to fatty liver        Granulosa cell tumor 2019     Priority: Medium    S/P laparoscopy  2019     Priority: Medium    Complex ovarian cyst 2018     Priority: Medium     Noted on ultrasound.  Recommended follow up at 8 weeks.          Anemia 2017     Priority: Medium    Postpartum hemorrhage of vagina 2017     Priority: Medium     Received pitocin IM and IV, cytotec, and methergine IM        Morbid obesity (H) 2016     Priority: Medium      OB History    Para Term  AB Living   5 4 4 0 0 4   SAB IAB Ectopic Multiple Live Births   0 0 0 0 4      # Outcome Date GA Lbr Kyree/2nd Weight Sex Delivery Anes PTL Lv   5 Current            4 Term 22 38w5d 08:06 / 00:08 3.43 kg (7 lb 9 oz) F Vag-Spont None N ARMANDO      Name: CHANDANFEMALE-SEE      Apgar1: 8  Apgar5: 9   3 Term 19 39w0d [id number] / 00:02 2.551 kg (5 lb 10 oz) M Vag-Spont None N ARMANDO      Name: CHANDANMALE-SEE      Apgar1: 9  Apgar5: 9   2 Term 17 39w5d 03:29 / 00:06 3.86 kg (8 lb 8.2 oz) M Vag-Spont Local N ARMANDO      Name: CHANDANMALE-SEE      Apgar1: 8  Apgar5: 10   1 Term 02/23/15 39w1d 04:39 / 04:18 3.43 kg (7 lb 9 oz) M Vag-Spont EPI N ARMANDO      Name: CHANDANMALE-SEE      Apgar1: 8  Apgar5: 9     Review of Systems Negative except what is noted in HPI.   Past Medical History:   Diagnosis Date    Granulosa cell tumor of ovary     Hepatic steatosis     Migraine     Ovarian cancer (H)       Past Surgical History:   Procedure Laterality Date    IR CHEST PORT PLACEMENT > 5 YRS OF AGE  2020    IR PORT PLACEMENT >5 YEARS  2020    CO LAP,DIAGNOSTIC ABDOMEN N/A 2019    Procedure: LAPAROSCOPY, DIAGNOSTIC, RIGHT OOPHERECTOMY;  Surgeon: Leeann Elizabeth DO;  Location: [facility];  Service: Gynecology    CO LAP,DIAGNOSTIC ABDOMEN N/A 2020    Procedure: LAPAROSCOPIC OMENTECTOMY, WASHINGS, RIGHT SALPINGECTOMY;  Surgeon: Adrianne Hua MD;  Location: [facility];  Service: Gynecology Oncology   Patient has no known allergies.  Family History   Problem Relation  Age of Onset    No Known Problems Mother     No Known Problems Sister     No Known Problems Brother      Social History     Socioeconomic History    Marital status: Single     Spouse name: Not on file    Number of children: Not on file    Years of education: Not on file    Highest education level: Not on file   Occupational History    Not on file   Tobacco Use    Smoking status: Never     Passive exposure: Yes    Smokeless tobacco: Never    Tobacco comments:     Spouse smokes outside   Substance and Sexual Activity    Alcohol use: Not Currently     Alcohol/week: 1.0 standard drink of alcohol     Types: 1 Glasses of wine per week     Comment: rarely, just for special occasions    Drug use: Never    Sexual activity: Yes     Partners: Male   Other Topics Concern    Not on file   Social History Narrative    Not on file     Social Determinants of Health     Financial Resource Strain: Not on file   Food Insecurity: Not on file   Transportation Needs: Not on file   Physical Activity: Not on file   Stress: Not on file   Social Connections: Not on file   Interpersonal Safety: Not on file   Housing Stability: Not on file     Prior to Admission Medication List  Medications Prior to Admission   Medication Sig Dispense Refill Last Dose    acetaminophen (TYLENOL) 325 MG tablet [ACETAMINOPHEN (TYLENOL) 325 MG TABLET] Take 2 tablets (650 mg total) by mouth every 6 (six) hours as needed for pain.  0 More than a month    docusate sodium (COLACE) 100 MG capsule Take 1 capsule (100 mg) by mouth daily 60 capsule 1 More than a month    ibuprofen (ADVIL/MOTRIN) 800 MG tablet Take 1 tablet (800 mg) by mouth every 8 hours as needed for other (cramping) 60 tablet 3 More than a month    loratadine (CLARITIN) 10 MG tablet Take 1 tablet (10 mg) by mouth daily 90 tablet 3 More than a month    vitamin D3 (CHOLECALCIFEROL) 10 MCG (400 UNIT) capsule Take 1 capsule (400 Units) by mouth daily 90 capsule 3 More than a month    levonorgestrel (MIRENA)  "20 MCG/DAY IUD by Intrauterine route once for 1 dose 1 each 0     polyethylene glycol (MIRALAX) 17 GM/Dose powder Take 17 g (1 capful) by mouth daily (Patient not taking: Reported on 6/13/2022) 507 g 3       Allergies  No Known Allergies  Immunization History   Administered Date(s) Administered    COVID-19 MONOVALENT 12+ (Pfizer) 06/23/2021, 07/14/2021    COVID-19 Monovalent 12+ (Pfizer 2022) 05/09/2022    DT (PEDS <7y) 04/29/2003    HPV9 06/14/2016, 08/08/2017, 08/21/2018    HepB, Unspecified 1990    Hepatitis A (ADULT 19+) 04/13/2015    Hepatitis B, Adult 04/13/2015, 04/11/2022    Hepatitis B, Peds 1990    Influenza Vaccine 18-64 (Flublok) 09/24/2014    Influenza Vaccine >6 months,quad, PF 09/24/2014, 11/29/2016, 09/06/2019, 09/30/2021    Influenza Vaccine, 6+MO IM (QUADRIVALENT W/PRESERVATIVES) 10/12/2018    MMR 1990, 12/19/2019    TDAP (Adacel,Boostrix) 01/02/2015, 05/01/2017, 10/04/2019, 01/07/2022    Td (Adult), Adsorbed 04/29/2003    Td,adult,historic,unspecified 04/29/2003      Physical Exam  Patient Vitals for the past 24 hrs:   BP Height   02/14/24 0815 135/89 --   02/14/24 0802 -- 1.575 m (5' 2\")     Wt Readings from Last 1 Encounters:   06/13/22 101.3 kg (223 lb 4 oz)   Prepregnancy: Pregravid weight not on file, BMI Could not be calculated. Total gain: Not found. (expected gain: Could not be calculated).    HEART: RRR, no murmur  LUNGS: CTA bilaterally  Fetal Heart Tones: Baseline 130 bpm, variability moderate (6-25 bpm), no decelerations. Reactive.  CONTRACTIONS:  irregular,intermittent, she doesn't feel them as being painful.   CERVIX: dilation 2.5 cm , 50% effaced, soft, and -2 station, though there is adipose tissue that complicates the check.   FLUID: None noted.  Fetal Presentation: vertex.  Labs    Group B Strep PCR   Date Value Ref Range Status   01/26/2024 Negative Negative Final     Comment:     Presumed negative for Streptococcus agalactiae (Group B Streptococcus) or the " number of organisms may be below the limit of detection of the assay.      Antibody Screen   Date Value Ref Range Status   06/26/2023 Negative Negative Final     Hepatitis B Surface Antigen   Date Value Ref Range Status   06/26/2023 Nonreactive Nonreactive Final     Neisseria gonorrhoeae   Date Value Ref Range Status   07/05/2019 Negative Negative Final     Hemoglobin   Date Value Ref Range Status   06/26/2023 13.4 11.7 - 15.7 g/dL Final      No visits with results within 2 Day(s) from this visit.   Latest known visit with results is:   Lab Requisition on 01/26/2024   Component Date Value Ref Range Status    Group B Strep PCR 01/26/2024 Negative  Negative Final    Presumed negative for Streptococcus agalactiae (Group B Streptococcus) or the number of organisms may be below the limit of detection of the assay.        Completed by:   Vanessa Fregoso MD    2/14/2024 8:20 AM   used: Not needed.

## 2024-02-14 NOTE — PROGRESS NOTES
"[facility] Labor and Delivery Progress Note    See Pratik MRN# [id number]   Age: 33 year old YOB: 1990           Subjective:   Getting uncomfortable when she is on her back.  She does not feel her contractions when she is standing up.  No lof.    She is on pitocin.             Objective:   Patient Vitals for the past 24 hrs:   BP Temp Temp src Resp SpO2 Height Weight Oximeter Heart Rate   24 1342 (!) 145/82 98.5  F (36.9  C) Oral 16 -- -- -- 64 bpm   24 1223 130/82 98.5  F (36.9  C) Oral 25 -- -- -- 62 bpm   24 1103 131/69 -- -- 16 98 % -- -- 76 bpm   24 0946 (!) 142/83 98.5  F (36.9  C) Oral 16 -- -- -- 83 bpm   24 0945 (!) 174/93 -- -- 16 -- -- -- 84 bpm   24 0835 -- -- -- -- -- -- 123.4 kg (272 lb) --   24 0821 -- -- -- -- 99 % -- -- 72 bpm   24 0815 135/89 98.3  F (36.8  C) Oral 16 99 % -- -- 86 bpm   24 0802 -- -- -- -- -- 1.575 m (5' 2\") -- --         Cervical Exam: 5.5 / 50% / -2     per nursing.    Membranes: Intact     Fetal Heart Rate:    Monitor: urvashi    Variability: moderate (amplitude range 6 to 25 bpm)    Baseline Rate: normal range    Fetal Heart Rate Tracing: category 1 with baseline 135, moderate variability.  Accels noted.            Assessment:   See Pratik is a 33 year old  who is 39w0d here for induction for ALBARRAN, morbid obesity, history of PPH, possible club foot on late ultrasound with MFM.            Plan:   Continue with pitocin induction.    Anticipate vaginal delivery  TXA at 8cm  Plan for possible large baby.   Cytotec and pitocin post delivery.    Reviewed possible AROM.  She will consider this.        Vanessa Fregoso MD      "

## 2024-02-14 NOTE — PROVIDER NOTIFICATION
Dr. Fregoso called discussed pt history of MFM US reporting possibility of bilateral club foot and ALBARRAN, plan will be to call delivery team at time of delivery and no further lab work needed for ALBARRAN at this time. Dr. Fregoso also notified of Bps 174/93 and 142/83, will check size of BP cuff and notify Dr. Fregoso if we have elevated blood pressures.

## 2024-02-14 NOTE — PLAN OF CARE
Initially used GE toco FHM then switched to urvashi. FHR is category one, patient unaware of contractions, contractions intermittent.  History of a PP hemorrhage, MD has plans in place for prophylactic treatment.  Pitocin started at 9:45 am.  Problem: Adult Inpatient Plan of Care  Goal: Optimal Comfort and Wellbeing  Outcome: Progressing  Intervention: Provide Person-Centered Care  Recent Flowsheet Documentation  Taken 2/14/2024 0844 by Mercedez Alex RN  Trust Relationship/Rapport:   care explained   emotional support provided   questions answered   questions encouraged   reassurance provided   thoughts/feelings acknowledged     Problem: Labor  Goal: Hemostasis  Outcome: Progressing     Problem: Labor  Goal: Stable Fetal Wellbeing  Outcome: Progressing     Problem: Labor  Goal: Acceptable Pain Control  Outcome: Progressing     Problem: Labor  Goal: Normal Uterine Contraction Pattern  Outcome: Progressing   Goal Outcome Evaluation:

## 2024-02-15 VITALS
SYSTOLIC BLOOD PRESSURE: 96 MMHG | DIASTOLIC BLOOD PRESSURE: 57 MMHG | RESPIRATION RATE: 18 BRPM | OXYGEN SATURATION: 97 % | HEART RATE: 63 BPM | WEIGHT: 259.7 LBS | HEIGHT: 62 IN | BODY MASS INDEX: 47.79 KG/M2 | TEMPERATURE: 98.3 F

## 2024-02-15 PROBLEM — K76.0 FATTY LIVER: Status: ACTIVE | Noted: 2020-08-12

## 2024-02-15 LAB — T PALLIDUM AB SER QL: NONREACTIVE

## 2024-02-15 PROCEDURE — 250N000013 HC RX MED GY IP 250 OP 250 PS 637: Performed by: FAMILY MEDICINE

## 2024-02-15 RX ORDER — ACETAMINOPHEN 325 MG/1
650 TABLET ORAL EVERY 4 HOURS PRN
COMMUNITY
Start: 2024-02-15

## 2024-02-15 RX ORDER — IBUPROFEN 600 MG/1
600 TABLET, FILM COATED ORAL EVERY 6 HOURS PRN
COMMUNITY
Start: 2024-02-15

## 2024-02-15 RX ORDER — MODIFIED LANOLIN
OINTMENT (GRAM) TOPICAL
COMMUNITY
Start: 2024-02-15

## 2024-02-15 RX ADMIN — ACETAMINOPHEN 650 MG: 325 TABLET ORAL at 07:33

## 2024-02-15 RX ADMIN — ACETAMINOPHEN 650 MG: 325 TABLET ORAL at 01:21

## 2024-02-15 RX ADMIN — IBUPROFEN 600 MG: 600 TABLET ORAL at 03:06

## 2024-02-15 RX ADMIN — ACETAMINOPHEN 650 MG: 325 TABLET ORAL at 11:41

## 2024-02-15 RX ADMIN — IBUPROFEN 800 MG: 800 TABLET ORAL at 09:15

## 2024-02-15 RX ADMIN — ACETAMINOPHEN 650 MG: 325 TABLET ORAL at 20:34

## 2024-02-15 RX ADMIN — ACETAMINOPHEN 650 MG: 325 TABLET ORAL at 17:09

## 2024-02-15 RX ADMIN — DOCUSATE SODIUM 100 MG: 100 CAPSULE, LIQUID FILLED ORAL at 07:34

## 2024-02-15 RX ADMIN — IBUPROFEN 600 MG: 600 TABLET ORAL at 15:20

## 2024-02-15 RX ADMIN — WITCH HAZEL: 500 SOLUTION RECTAL; TOPICAL at 20:34

## 2024-02-15 ASSESSMENT — ACTIVITIES OF DAILY LIVING (ADL)
ADLS_ACUITY_SCORE: 20

## 2024-02-15 NOTE — DISCHARGE INSTRUCTIONS
"Know Your Blood Pressure Numbers  For patients who've had a high blood pressure disorder of pregnancy  What to know about high blood pressure disorders of pregnancy  People who had high blood pressure during pregnancy may continue to have high blood pressure for up to 12 weeks after pregnancy. It can also raise your lifetime risk of chronic high blood pressure, heart disease and blood vessel disease. It is vital that you keep monitoring your blood pressure and taking steps to control it.   The general guidelines below are what your blood pressures mean.  A heart healthy lifestyle that includes blood pressure control can help reduce these risks. A good blood pressure goal is less than 130/80 mmHg long-term.  Talk to your provider about high blood pressure disorder of pregnancy and what this means for your lifelong health.   Know your numbers  Read \"Checking Your Blood Pressure at Home\" to learn the best way to take your blood pressure.  Refer to the next page for general guidelines about what your blood pressures mean and what to do about them. Follow these instructions unless your provider tells you something different.  For more resources, visit www.preeclampsia.org.  What to do if your blood pressure is high  Act right away if you have numbers in the yellow or red range--don't wait for a scheduled appointment.  When to call your provider  Regardless of your blood pressure, call your healthcare provider right away if you develop any of these symptoms:  Severe headache  Chest pain  Trouble breathing  Stomach pain  Changes in vision  Swelling in your hands and face.  Please say, \"I am having symptoms of high blood pressure. My provider told me to call and ask to be seen right away when I have these symptoms.\"  If you ARE pregnant OR   it has been less than 12 weeks since you delivered  Systolic pressure (top number) is....  Diastolic (bottom number) is.... Your blood pressure is....   160 or higher  or 110 or higher " VERY HIGH. Check it again in 10 minutes, then contact your provider.   140 - 159  or 90 - 109 HIGH. Keep checking blood pressure 2 times a day. If your blood pressure is in this range for 2 readings, contact your provider within 24 hours. We will discuss starting or increasing your blood pressure medicine.   100 - 139  and 60 - 89 NORMAL. Your blood pressure looks great!   Keep checking it 2 times a day.   Less than 100  or Less than 60 LOW. Check your blood pressure again in   10 minutes, then contact your provider. We may need to make changes to your blood pressure medicine.     Call your provider right away if you have these symptoms: a severe headache, vision changes, shortness of breath, chest pain, or right upper belly pain. Call even if your blood pressure is okay.  Call 9-1-1 if you feel the symptoms are severe and that it is an emergency.   If you are NOT pregnant OR   it has been more than 12 weeks since you delivered  Systolic pressure (top number) is....  Diastolic (bottom number) is.... Your blood pressure is....   Higher than 180 and/or Higher than 120 Hypertensive crisis. Call your doctor right away.   140 or higher or  90 or higher Hypertension Stage 2. Follow up with your provider.   130-139 or Less than 80 Hypertension Stage 1. Follow up with your provider.   120-129 and Less than 80 Elevated blood pressure (pre-hypertension). You are at higher risk of developing high blood pressure. Follow up with your provider.   Less than 120 and Less than 80 Normal.     Call your provider right away if you have these symptoms: a severe headache, vision changes, shortness of breath, chest pain, or right upper belly pain. Call even if your blood pressure is okay.  Call 9-1-1 if you feel the symptoms are severe and that it is an emergency.   For informational purposes only. Not to replace the advice of your health care provider. Copyright   2023 [facility]. All rights reserved. Clinically reviewed by  [facility]. [employer] [id number] - .  After Your Delivery (the Postpartum Period): Care Instructions  Overview     Congratulations on the birth of your baby. Like pregnancy, the  period can be a time of excitement, renetta, and exhaustion. You may look at your wondrous little baby and feel happy. You may also be overwhelmed by your new sleep hours and new responsibilities.  At first, babies often sleep during the days and are awake at night. They do not have a pattern or routine. They may make sudden gasps, jerk themselves awake, or look like they have crossed eyes. These are all normal, and they may even make you smile.  In these first weeks after delivery, try to take good care of yourself. It may take 4 to 6 weeks to feel like yourself again, and possibly longer if you had a  birth. You will likely feel very tired for several weeks. Your days will be full of ups and downs, but lots of renetta as well.  Follow-up care is a key part of your treatment and safety. Be sure to make and go to all appointments, and call your doctor if you are having problems. It's also a good idea to know your test results and keep a list of the medicines you take.  How can you care for yourself at home?  Take care of your body after delivery  Use pads instead of tampons for the bloody flow that may last as long as 2 weeks.  Ease cramps with ibuprofen (Advil, Motrin).  Ease soreness of hemorrhoids and the area between your vagina and rectum with ice compresses or witch hazel pads.  Ease constipation by drinking lots of fluid and eating high-fiber foods. Ask your doctor about over-the-counter stool softeners.  Cleanse yourself with a gentle squeeze of warm water from a bottle instead of wiping with toilet paper.  Take a sitz bath in warm water several times a day.  Wear a good nursing bra. Ease sore and swollen breasts with warm, wet washcloths.  If you aren't breastfeeding, use ice rather than heat for  "breast soreness.  Your period may not start for several months if you are breastfeeding. You may bleed more, and longer at first, than you did before you got pregnant.  Wait until you are healed (about 4 to 6 weeks) before you have sex. Ask your doctor when it is okay for you to have sex.  Try not to travel with your baby for 5 or 6 weeks. If you take a long car trip, make frequent stops to walk around and stretch.  Avoid exhaustion  Rest every day. Try to nap when your baby naps.  Ask another adult to be with you for a few days after delivery.  Plan for  if you have other children.  Stay flexible so you can eat at odd hours and sleep when you need to. Both you and your baby are making new schedules.  Plan small trips to get out of the house. Change can make you feel less tired.  Ask for help with housework, cooking, and shopping. Remind yourself that your job is to care for your baby.  Know about help for postpartum depression  \"Baby blues\" are common for the first 1 to 2 weeks after birth. You may cry or feel sad or irritable for no reason.  Rest whenever you can. Being tired makes it harder to handle your emotions.  Go for walks with your baby.  Talk to your partner, friends, and family about your feelings.  If your symptoms last for more than a few weeks, or if you feel very depressed, ask your doctor for help.  Postpartum depression can be treated. Support groups and counseling can help. Sometimes medicine can also help.  Stay healthy  Eat healthy foods so you have more energy.  If you breastfeed, avoid drugs. If you quit smoking during pregnancy, try to stay smoke-free. If you choose to have a drink now and then, have only one drink, and limit the number of occasions that you have a drink. Wait to breastfeed at least 2 hours after you have a drink to reduce the amount of alcohol the baby may get in the milk.  Start daily exercise after 4 to 6 weeks, but rest when you feel tired.  Learn exercises to " tone your belly. Try Kegel exercises to regain strength in your pelvic muscles. You can do these exercises while you stand or sit. (If doing these exercises causes pain, stop doing them and talk with your doctor.)  Squeeze your muscles as if you were trying not to pass gas. Or squeeze your muscles as if you were stopping the flow of urine. Your belly, legs, and buttocks shouldn't move.  Hold the squeeze for 3 seconds, then relax for 5 to 10 seconds.  Start with 3 seconds, then add 1 second each week until you are able to squeeze for 10 seconds.  Repeat the exercise 10 times a session. Do 3 to 8 sessions a day.  Find a class for you and your baby that has an exercise time.  If you had a  birth, give yourself a bit more time before you exercise, and be careful.  When should you call for help?  Share this information with your partner, family, or a friend. They can help you watch for warning signs.  Call 911  anytime you think you may need emergency care. For example, call if:    You have thoughts of harming yourself, your baby, or another person.     You passed out (lost consciousness).     You have chest pain, are short of breath, or cough up blood.     You have a seizure.   Where to get help 24 hours a day, 7 days a week   If you or someone you know talks about suicide, self-harm, a mental health crisis, a substance use crisis, or any other kind of emotional distress, get help right away. You can:    Call the Suicide and Crisis Lifeline at 255.     Call 3-917-163-TALK (1-912.442.6137).     Text HOME to 265896 to access the Crisis Text Line.   Consider saving these numbers in your phone.  Go to [email or web address] for more information or to chat online.  Call your doctor now or seek immediate medical care if:    You have signs of hemorrhage (too much bleeding), such as:  Heavy vaginal bleeding. This means that you are soaking through one or more pads in an hour. Or you pass blood clots bigger than an  "egg.  Feeling dizzy or lightheaded, or you feel like you may faint.  Feeling so tired or weak that you cannot do your usual activities.  A fast or irregular heartbeat.  New or worse belly pain.     You have signs of infection, such as:  A fever.  Frequent or painful urination or blood in your urine.  Vaginal discharge that smells bad.  New or worse belly pain.     You have symptoms of a blood clot in your leg (called a deep vein thrombosis), such as:  Pain in the calf, back of the knee, thigh, or groin.  Swelling in the leg or groin.  A color change on the leg or groin. The skin may be reddish or purplish, depending on your usual skin color.     You have signs of preeclampsia, such as:  Sudden swelling of your face, hands, or feet.  New vision problems (such as dimness, blurring, or seeing spots).  A severe headache.     You have signs of heart failure, such as:  New or increased shortness of breath.  New or worse swelling in your legs, ankles, or feet.  Sudden weight gain, such as more than 2 to 3 pounds in a day or 5 pounds in a week.  Feeling so tired or weak that you cannot do your usual activities.     You had spinal or epidural pain relief and have:  New or worse back pain.  Increased pain, swelling, warmth, or redness at the injection site.  Tingling, weakness, or numbness in your legs or groin.   Watch closely for changes in your health, and be sure to contact your doctor if:    Your vaginal bleeding isn't decreasing.     You feel sad, anxious, or hopeless for more than a few days.     You are having problems with your breasts or breastfeeding.   Where can you learn more?  Go to https://www.[employer].net/patiented  Enter A461 in the search box to learn more about \"After Your Delivery (the Postpartum Period): Care Instructions.\"  Current as of: July 11, 2023               Content Version: 13.8    9318-2126 [employer].   Care instructions adapted under license by your healthcare professional. If " you have questions about a medical condition or this instruction, always ask your healthcare professional. Healthwise, [facility] disclaims any warranty or liability for your use of this information.    Bottle-Feeding: Care Instructions  Overview     A lot of people bottle-feed their babies. Sometimes it's a personal decision. Sometimes there's a medical reason, like HIV infection or certain cancer treatments. Many adoptive parents bottle-feed. You can bottle-feed using breast milk or formula.  Formula can provide all the calories and nutrients your baby needs in the first 6 months of life. Several types of formula are available. Most babies start with a cow's milk-based formula. Talk to your doctor before trying other types of formula, which include soy and lactose-free formulas.  Your  baby probably will want to eat every 2 to 3 hours. Don't worry about the exact timing for the first few weeks, but feed your baby whenever they are hungry. In general, your baby should not go longer than 4 hours without eating during the day for the first few months. Sit in a comfortable chair with your arms supported on pillows.  Follow-up care is a key part of your child's treatment and safety. Be sure to make and go to all appointments, and call your doctor if your child is having problems. It's also a good idea to know your child's test results and keep a list of the medicines your child takes. At each well-baby visit, talk to your doctor about your baby's nutritional needs, which change as he or she grows and develops.  How can you care for yourself at home?  Getting ready to bottle-feed  Prepare your supplies for bottle-feeding before your baby is born, if you can.  Have a supply of small bottles (usually 4 ounces) for your baby's first few weeks.  You may want to buy a variety of bottle nipples so you can see which type your baby likes.  Before you use bottles and nipples the first time, wash them in hot water and  dish soap. Then rinse them with hot water.  Using infant formula  If you plan to use formula, ask your doctor which kind to use. You can buy it as a liquid concentrate or as a powder that you mix with water. Formulas also come in a ready-to-feed form. Always use formula with added iron unless the doctor says not to.  Make sure you have clean, safe water to mix with the formula. If you aren't sure if your water is safe, you can use bottled water. Or you can boil tap water.  Boil cold tap water for 1 minute, then cool the water to room temperature.  Use the cooled boiled water to mix the formula within 30 minutes.  Wash your hands before you prepare the formula.  Read the label to see how much water to mix with the formula. If you add too little water, it can upset your baby's stomach. If you add too much water, your baby won't get the right nutrition.  Cover the prepared formula, and store it in a refrigerator. Use it within 24 hours.  Soak dirty baby bottles in water and dish soap. Wash bottles and nipples in the upper rack of a . Or you can hand-wash them in hot water with dish soap.  Using breast milk  If you plan to use a bottle to feed breast milk to your baby, you can safely store pumped milk in plastic bottle liners, small freezer bags, or glass bottles.  Wash your hands before you touch the containers. If you use bottles, make sure they are clean.  Thaw frozen breast milk carefully. Run warm water over the container. You can also thaw breast milk overnight in the refrigerator. Don't refreeze thawed milk.  You can keep breast milk at room temperature for 6 to 8 hours if the milk was collected under clean conditions. This means using properly washed hands and properly cleaned pump parts and containers.  How to bottle-feed  Warm the formula or breast milk to room temperature or body temperature before feeding. The best way to warm it is in a bowl of heated water. Do not use a microwave oven. It can  cause hot spots that can burn your baby's mouth.  Before feeding your baby, check the temperature of the formula or breast milk by dripping 2 or 3 drops on the inside of your wrist. It should be warm, not cold or hot.  Place a bib or cloth under your baby's chin to help keep clothes clean. Have a second cloth handy to use when burping your baby.  Support your baby with one arm, with your baby's head resting in the bend of your elbow. Keep your baby's head higher than their chest.  Stroke the center of your baby's lower lip to encourage the mouth to open wider. A wide mouth will cover more of the nipple and will help reduce the amount of air the baby sucks in.  Angle the bottle so the neck of the bottle and the nipple stay full of milk. This helps reduce how much air your baby swallows.  Do not prop the bottle in your baby's mouth or let them hold it alone. Avoiding those things can reduce your baby's chances of choking or getting ear infections.  During the first few weeks, burp your baby after every 2 ounces of formula or breast milk. This helps get rid of swallowed air and reduces spitting up.  You will know your baby is full when they stop sucking. Your baby may spit out the nipple, turn their head away, or fall asleep when full.  babies usually drink from 1 to 3 ounces each feeding.  Throw away any formula or breast milk left in the bottle after you have fed your baby. Bacteria can grow in the leftover formula or breast milk.  To reduce spitting up, try holding your baby upright for about 30 minutes after they eat.  When should you call for help?  Watch closely for changes in your child's health, and be sure to contact your doctor if:    Your child does not seem to be growing and gaining weight.     Your child has trouble passing stools, or his or her stools are hard and dry.     Your child is vomiting.     Your child has diarrhea or a skin rash.     Your child cries most of the time.     Your child has  "gas, bloating, or cramps after drinking a bottle.   Where can you learn more?  Go to https://www.[employer].net/patiented  Enter P111 in the search box to learn more about \"Bottle-Feeding: Care Instructions.\"  Current as of: February 28, 2023               Content Version: 13.8    3845-8678 [employer].   Care instructions adapted under license by your healthcare professional. If you have questions about a medical condition or this instruction, always ask your healthcare professional. [employer] disclaims any warranty or liability for your use of this information.    Warning Signs after Having a Baby    Keep this paper on your fridge or somewhere else where you can see it.    Call your provider if you have any of these symptoms up to 12 weeks after having your baby.    Thoughts of hurting yourself or your baby  Pain in your chest or trouble breathing  Severe headache not helped by pain medicine  Eyesight concerns (blurry vision, seeing spots or flashes of light, other changes to eyesight)  Fainting, shaking or other signs of a seizure    Call 9-1-1 if you feel that it is an emergency.     The symptoms below can happen to anyone after giving birth. They can be very serious. Call your provider if you have any of these warning signs.    My provider s phone number: _______________________    Losing too much blood (hemorrhage)    Call your provider if you soak through a pad in less than an hour or pass blood clots bigger than a golf ball. These may be signs that you are bleeding too much.    Blood clots in the legs or lungs    After you give birth, your body naturally clots its blood to help prevent blood loss. Sometimes this increased clotting can happen in other areas of the body, like the legs or lungs. This can block your blood flow and be very dangerous.     Call your provider if you:  Have a red, swollen spot on the back of your leg that is warm or painful when you touch it.   Are " coughing up blood.     Infection    Call your provider if you have any of these symptoms:  Fever of 100.4 F (38 C) or higher.  Pain or redness around your stitches if you had an incision.   Any yellow, white, or green fluid coming from places where you had stitches or surgery.    Mood Problems (postpartum depression)    Many people feel sad or have mood changes after having a baby. But for some people, these mood swings are worse.     Call your provider right away if you feel so anxious or nervous that you can't care for yourself or your baby.    Preeclampsia (high blood pressure)    Even if you didn't have high blood pressure when you were pregnant, you are at risk for the high blood pressure disease called preeclampsia. This risk can last up to 12 weeks after giving birth.     Call your provider if you have:   Pain on your right side under your rib cage  Sudden swelling in the hands and face    Remember: You know your body. If something doesn't feel right, get medical help.     For informational purposes only. Not to replace the advice of your health care provider. Copyright 2020 [facility]. All rights reserved. Clinically reviewed by Lena Castrejon, RNC-OB, MSN. [employer] [id number] - Rev 02/23.

## 2024-02-15 NOTE — PROGRESS NOTES
[facility] RN Care Coordinator Note    See Pratik  [id number]  1990    Chart reviewed, discharge follow-up plan discussed with patient's bedside RN, needs assessed. If stable, discharge planned this evening after  testing. Post-delivery follow-up appointment planned with  in 6 weeks at [facility]. Home care nurse visit not ordered by discharging provider.    RN Care Coordinator will continue to follow and assist with discharge planning as needed.

## 2024-02-15 NOTE — PLAN OF CARE
Problem: Postpartum (Vaginal Delivery)  Goal: Hemostasis  Outcome: Progressing  Goal: Optimal Pain Control and Function  Outcome: Progressing  Intervention: Prevent or Manage Pain  Recent Flowsheet Documentation  Taken 2/15/2024 0121 by Rebecca Olivia RN  Pain Management Interventions: medication (see MAR)  Taken 2/15/2024 0000 by Rebecca Olivia RN  Perineal Care:   absorbent brief/pad changed   perineal hygiene encouraged  Goal: Effective Urinary Elimination  Outcome: Progressing   Goal Outcome Evaluation:       VSS, pt c/o of moderate pain. Tylenol and Ibuprofen is effective per pt. Fundus is firm and bleeding is light. Voiding without complication. Bonding well with infant.

## 2024-02-15 NOTE — DISCHARGE SUMMARY
Maternal Discharge Summary  [facility]  Date of Service: 2/15/2024    Name      See Pratik         1990  MRN       [id number]  PCP        Vanessa Fregoso    Admit Date:  2024  Discharge Date:  2/15/2024    Principal Diagnosis:    Patient Active Problem List   Diagnosis    Morbid obesity (H)    Postpartum hemorrhage of vagina    Anemia    Complex ovarian cyst    S/P laparoscopy    Granulosa cell tumor    Fatty liver    Cardiomegaly    Elevated LFTs    Neoplasm of uncertain behavior of ovary    Encounter for triage in pregnant patient    Pregnancy    Obesity affecting pregnancy in third trimester       Delivery Type: vaginal, spontaneous     Hospital Course:  See Pratik is a 33 year old now  s/p vaginal, spontaneous  at 39w0d on 24.  The patient's hospital course was unremarkable.  She recovered as anticipated and experienced no post-delivery complications.  On discharge, her pain was well controlled.  Vaginal bleeding is normal.  Voiding without difficulty.  Ambulating well and tolerating a normal diet.  No fevers.       Conditions complicating Pregnancy: ALBARRAN    Procedure(s) Performed:     Discharge Plan:   Discharge to Home. Condition at Discharge:  stable  Physical activity: Regular.  Diet:  Regular.  Home care nurse: not indicated.  Contraception plan: undecided, will follow up at postpartum visit.  Follow up with Vanessa Harrison in 6 weeks.    Discharge Medications:   Current Discharge Medication List        START taking these medications    Details   lanolin ointment Apply topically every hour as needed for other (sore nipples)    Associated Diagnoses: Vaginal delivery      witch hazel-glycerin (TUCKS) pad Apply topically every hour as needed for hemorrhoids or other (episiotomy pain/itching)    Associated Diagnoses: Vaginal delivery           CONTINUE these medications which have CHANGED    Details   acetaminophen (TYLENOL) 325 MG tablet Take  "2 tablets (650 mg) by mouth every 4 hours as needed for mild pain or fever (greater than or equal to 38  C /100.4  F (oral) or 38.5  C/ 101.4  F (core).)    Associated Diagnoses: Vaginal delivery      ibuprofen (ADVIL/MOTRIN) 600 MG tablet Take 1 tablet (600 mg) by mouth every 6 hours as needed for inflammatory pain    Associated Diagnoses: Vaginal delivery           STOP taking these medications       docusate sodium (COLACE) 100 MG capsule Comments:   Reason for Stopping:         levonorgestrel (MIRENA) 20 MCG/DAY IUD Comments:   Reason for Stopping:         loratadine (CLARITIN) 10 MG tablet Comments:   Reason for Stopping:         polyethylene glycol (MIRALAX) 17 GM/Dose powder Comments:   Reason for Stopping:         vitamin D3 (CHOLECALCIFEROL) 10 MCG (400 UNIT) capsule Comments:   Reason for Stopping:               Allergies: No Known Allergies    Discharge Exam:  /71 (BP Location: Left arm, Patient Position: Semi-Landaverde's, Cuff Size: Adult Regular)   Pulse 61   Temp 98.2  F (36.8  C) (Oral)   Resp 16   Ht 1.575 m (5' 2\")   Wt 117.2 kg (258 lb 6.4 oz)   SpO2 96%   Breastfeeding Unknown   BMI 47.26 kg/m    General - alert, comfortable  Heart - RRR, no murmurs  Lungs - CTA bilaterally  Abdomen - fundus firm, nontender, below umbilicus  Extremities - trace edema    Post-partum hemoglobin:   Hemoglobin   Date Value Ref Range Status   02/14/2024 12.5 11.7 - 15.7 g/dL Final       Greater than 30 minutes were spent on this discharge on the day of service.      Completed by:   Romy Olmos MD  [facility]  2/15/2024 9:07 AM    "

## 2024-02-15 NOTE — PLAN OF CARE
"Care Plan Progress Note      Name: See Pratik  : 1990  MRN: [id number]    VS: /65   Temp 98.4  F (36.9  C) (Oral)   Resp 20   Ht 1.575 m (5' 2\")   Wt 123.4 kg (272 lb)   SpO2 99%   Breastfeeding Unknown   BMI 49.75 kg/m      Problem: Labor  Goal: Hemostasis  Outcome: Met  Goal: Stable Fetal Wellbeing  Outcome: Met  Goal: Effective Progression to Delivery  Outcome: Met  Goal: Absence of Infection Signs and Symptoms  Outcome: Met  Goal: Acceptable Pain Control  Outcome: Met  Goal: Normal Uterine Contraction Pattern  Outcome: Met     VSS, c/o afterbirth pains ibuprofen given per provider.  Fundus firm at umbilicus.  Voiding freely.   Any De La Torre RN  2024  8:50 PM        "

## 2024-02-15 NOTE — PROVIDER NOTIFICATION
Patient requesting oxy for pain, called Dr Fregoso who declined request for oxy states to treat with ibuprofen.   Any De La Torre RN  2/14/2024  8:36 PM

## 2024-02-15 NOTE — L&D DELIVERY NOTE
OB Vaginal Delivery Note    See Pratik MRN# [id number]   Age: 33 year old YOB: 1990       GA: 39w0d  GP:   Labor Complications: Preeclampsia/Hypertension   EBL:   mL  Delivery QBL: 450 mL  Delivery Type: Vaginal, Spontaneous   ROM to Delivery Time: (Delivered) Hours: 2 Minutes: 1  [place] Weight:     1 Minute 5 Minute 10 Minute   Apgar Totals: 8   9        PASTORA OLVERA;MCKAY JOHNSON     Delivery Details:  See Pratik, a 33 year old  female delivered a viable infant male with apgars of 8  and 9 . She was induced for morbid obesity, per recommendation of [facility].  She arrived at 2-3cm, and pitocin was initiated.  She did not feel any significant contractions until after AROM at 6cm.  She received TXA at 8cm.  She was in multiple position changes, and intermittently had elevated blood pressures, which returned to normal without intervention.  She felt a strong urge to push when she was 9cm.  When she was 9cm she started to experience acute onset blurry vision in both eyes . Prior this she had a mild headache, which she states was present throughout the day, but only indicated was present when she was asked about this in the setting of intermittently elevated blood pressures.  She had a normal neuro exam at the time of the onset of blurry vision, though her gait was not evaluated nor was her Romberg due to labor.  Her headache had resolved spontaneously at the time of her onset of blurry vision, and her blurry vision in her left eye resolved over the course of 10 minutes.  She had no new neuro sx, and had normal strength, movement, sensation throughout.  No clonus noted.  CN intact.  Peerla.  Eomi.  Her blood pressures stabilized without intervention.  She felt a strong urge to push and the baby was found to be at the introitus.  Delivery was via vaginal, spontaneous  to a sterile field under none  anesthesia. Infant delivered in vertex  left  occiput  anterior  position. Anterior and posterior  shoulders delivered without difficulty. The cord was clamped, cut twice and 3 vessels  were noted. Cord blood was obtained in routine fashion with the following disposition: discard .  She did receive pitocin and cytotec immediately after delivery.      Cord complications: none   Placenta delivered at 2024  5:52 PM . Placental disposition was Hospital disposal . Fundal massage performed and fundus found to be firm.     Episiotomy: none    Perineum, vagina, cervix were inspected, and the following lacerations were noted:   Perineal lacerations: none                Excellent hemostasis was noted. Needle count correct. Infant and patient in delivery room in good and stable condition.   See continued to have a normal neuro exam with the exception of persistent blurry vision in her right eye, so a stat head CT was ordered.  If this is normal, but her sx persist, will obtain an MRI.    Continue to monitor blood pressures.    Avoid ibuprofen, toradol, until head CT results are back    Will not start lovenox at this time until it is determined that there is no hemorrhagic stroke.    Start SCD's for prevention of DVT.           Qi Christie-See [[id number]]      Labor Event Times      Active labor onset date: 24 Onset time:  3:54 PM   Dilation complete date: 24 Complete time:  5:49 PM   Start pushing date/time: 2024 1749          Labor Events     labor?: No   steroids: None  Labor Type: Induction/Cervical ripening  Predominate monitoring during 1st stage: continuous electronic fetal monitoring     Antibiotics received during labor?: No     Rupture identifier: Sac 1  Rupture date/time: 24 1548   Rupture type: Artificial Rupture of Membranes  Fluid color: Bloody, Pink, Clear  Fluid odor: Normal     Induction: Oxytocin  Induction date/time: 24 0938    Cervical ripening date/time:      Indications for induction: Obesity     Augmentation: AROM, Oxytocin       Delivery/Placenta Date and  Time      Delivery Date: 24 Delivery Time:  5:49 PM   Placenta Date/Time: 2024  5:52 PM  Oxytocin given at the time of delivery: after delivery of baby  Delivering clinician: Vanessa Fregoso MD   Other personnel present at delivery:  Provider Role   Raysa Grant RN Registered Nurse   Leeann Watkins RN Registered Nurse             Vaginal Counts       Initial count performed by 2 team members:  Two Team Members   Dr. Ildefonso Grant RN         [place] Suture Needles Sponges (RETIRED) Instruments   Initial counts   5    Added to count       Relief counts       Final counts   5            Placed during labor Accounted for at the end of labor   FSE No NA   IUPC No NA   Cervidil No NA                  Final count performed by 2 team members:  Two Team Members   Dr. Ildefonso Grant RN      Final count correct?: Yes  Pre-Birth Team Brief: Complete  Post-Birth Team Debrief: Complete       Apgars    Living status: Living   1 Minute 5 Minute 10 Minute 15 Minute 20 Minute   Skin color: 0  1       Heart rate: 2  2       Reflex irritability: 2  2       Muscle tone: 2  2       Respiratory effort: 2  2       Total: 8  9       Apgars assigned by: MAY DIETRICH/ ELIZABETH RN       Cord      Vessels: 3 Vessels    Cord Complications: None               Cord Blood Disposition: Discard    Gases Sent?: No    Delayed cord clamping?: Yes    Cord Clamping Delay (seconds):  seconds    Stem cell collection?: No           Varna Resuscitation    Methods: None  Output in Delivery Room: Voided       Varna Measurements    Output in delivery room: Voided       Skin to Skin and Feeding Plan      Skin to skin initiation date/time:     Skin to skin with: Mother  Skin to skin end date/time:            Labor Events and Shoulder Dystocia    Fetal Tracing Prior to Delivery: Category 1  Shoulder dystocia present?: Neg       Delivery (Maternal) (Provider to Complete) (055149)    Episiotomy: None  Perineal  lacerations: None    Repair suture: None  Genital tract inspection done: Pos       Blood Loss  Mother: Pratik, See #[id number]     Start of Mother's Information      Delivery Blood Loss  02/14/24 1554 - 02/14/24 1823      Delivery QBL (mL) Hospital Encounter 450 mL    Total  450 mL               End of Mother's Information  Mother: Pratik, See #[id number]                Delivery - Provider to Complete ([id number])    Delivering clinician: Vanessa Fregoso MD  Delivery Type (Choose the 1 that will go to the Birth History): Vaginal, Spontaneous                         Other personnel:  Provider Role   Raysa Grant, RN Registered Nurse   Leeann Watkins RN Registered Nurse                    Placenta    Date/Time: 2/14/2024  5:52 PM  Removal: Spontaneous  Disposition: Hospital disposal             Anesthesia    Method: None                    Presentation and Position    Presentation: Vertex    Position: Left Occiput Anterior                     Vanessa Fregoso MD

## 2024-02-15 NOTE — PROGRESS NOTES
Late entry due to patient care. See developed elevated blood pressure of 171/82 and at that time felt urge to push and reporting she was more uncomfortable, writer notified Dr. Fregoso of blood pressure and pt reporting feeling urge to push, SVE by Dr. Fregoso, next /66 Dr. Fregoso notified. Writer steps out of room to collect peanut ball for position change, patient calls writer to room noting blurry vision, writer called charge RN for  bedside, Dr. Fregoso comes to bedside to perform neuro exam. After neuro exam pt reports urge to push, DAVID Fregoso, complete,  1749. After delivery pt denies HA and reports blurry vision improving however, still present. Per Dr. Fregoso she will order STAT head CT. Writer asked if Dr. Fregoso wanted rapid response called, per Dr. Fregoso no rapid response to be called. Writer remained bedside for PP assessments and VS.

## 2024-02-15 NOTE — PLAN OF CARE
Denies HA. Plans to discharge this evening. PRN tylenol and PRN ibuprofen given with some relief. Up to bathroom to void without difficulty. Supportive mother and  at bedside. Awaiting to get ROP done. Birth turned in. Vitals stable.    Problem: Postpartum (Vaginal Delivery)  Goal: Optimal Pain Control and Function  Outcome: Progressing     Problem: Postpartum (Vaginal Delivery)  Goal: Successful Parent Role Transition  Outcome: Met  Intervention: Support Parent Role Transition  Recent Flowsheet Documentation  Taken 2/15/2024 0800 by Scar Fernández RN  Supportive Measures:   active listening utilized   positive reinforcement provided   verbalization of feelings encouraged  Parent-Child Attachment Promotion:   interaction encouraged   participation in care promoted   strengths emphasized  Goal: Hemostasis  Outcome: Met  Goal: Effective Urinary Elimination  Outcome: Met    Scar Fernández RN

## 2024-02-15 NOTE — PROGRESS NOTES
"Maternal Postpartum Progress Note  [facility]  Date of Service: 2/15/2024    Name      See Pratik         1990  MRN       [id number]  PCP        Vanessa Fregoso        Subjective:  The patient feels well:  Voiding without difficulty, lochia normal, tolerating normal diet, and passing flatus.  Pain is well controlled with current medications.  The patient has no emotional concerns.  No calf pain or swelling.  The baby is well and being fed by bottle.    Objective:  /71 (BP Location: Left arm, Patient Position: Semi-Landaverde's, Cuff Size: Adult Regular)   Pulse 61   Temp 98.2  F (36.8  C) (Oral)   Resp 16   Ht 1.575 m (5' 2\")   Wt 117.2 kg (258 lb 6.4 oz)   SpO2 96%   Breastfeeding Unknown   BMI 47.26 kg/m    Lochia is minimal.  The uterine fundus is firm at umbilicus.  Urinary output is adequate. No calf tenderness.  No edema.    Labs:  Hemoglobin   Date Value Ref Range Status   2024 12.5 11.7 - 15.7 g/dL Final       Assessment:    -Postpartum Day 1 s/p vaginal delivery  -Normal postpartum course.      Plan:    -Continue current care.  -doing well    Completed by:   Romy Olmos MD  [facility]  2/15/2024 8:40 AM   "

## 2024-02-16 NOTE — PLAN OF CARE
All discharge education completed including review of warning signs. Pt. verbalized understanding and denied having additional quetions. Follow-up is planned for 6 weeks and patient stated she will call to make the appointment.

## 2024-02-16 NOTE — PLAN OF CARE
Problem: Postpartum (Vaginal Delivery)  Goal: Absence of Infection Signs and Symptoms  Outcome: Progressing  Goal: Optimal Pain Control and Function  2/15/2024 1819 by Scar Fernández RN  Outcome: Progressing  2/15/2024 1302 by Scar Fernández RN  Outcome: Progressing     Problem: Adult Inpatient Plan of Care  Goal: Optimal Comfort and Wellbeing  Outcome: Met  Intervention: Provide Person-Centered Care  Recent Flowsheet Documentation  Taken 2/15/2024 1600 by Scar Fernández RN  Trust Relationship/Rapport:   thoughts/feelings acknowledged   reassurance provided   questions encouraged   questions answered   empathic listening provided   emotional support provided   choices provided   care explained  Taken 2/15/2024 0800 by Scar Fernández RN  Trust Relationship/Rapport:   thoughts/feelings acknowledged   reassurance provided   questions encouraged   questions answered   empathic listening provided   emotional support provided   choices provided   care explained     Problem: Postpartum (Vaginal Delivery)  Goal: Successful Parent Role Transition  Outcome: Met  Intervention: Support Parent Role Transition  Recent Flowsheet Documentation  Taken 2/15/2024 1600 by Scar Fernández RN  Supportive Measures:   active listening utilized   positive reinforcement provided   verbalization of feelings encouraged  Parent-Child Attachment Promotion:   interaction encouraged   participation in care promoted   strengths emphasized  Taken 2/15/2024 0800 by Scar Fernández RN  Supportive Measures:   active listening utilized   positive reinforcement provided   verbalization of feelings encouraged  Parent-Child Attachment Promotion:   interaction encouraged   participation in care promoted   strengths emphasized  Goal: Hemostasis  Outcome: Met  Goal: Effective Urinary Elimination  Outcome: Met    Scar Fernández RN

## 2024-04-01 ENCOUNTER — LAB REQUISITION (OUTPATIENT)
Dept: LAB | Facility: CLINIC | Age: 34
End: 2024-04-01

## 2024-04-01 DIAGNOSIS — Z13.220 ENCOUNTER FOR SCREENING FOR LIPOID DISORDERS: ICD-10-CM

## 2024-04-01 LAB
CHOLEST SERPL-MCNC: 224 MG/DL
FASTING STATUS PATIENT QL REPORTED: ABNORMAL
HDLC SERPL-MCNC: 44 MG/DL
LDLC SERPL CALC-MCNC: 135 MG/DL
NONHDLC SERPL-MCNC: 180 MG/DL
TRIGL SERPL-MCNC: 224 MG/DL

## 2024-04-01 PROCEDURE — 80061 LIPID PANEL: CPT | Performed by: FAMILY MEDICINE

## 2024-07-27 ENCOUNTER — HEALTH MAINTENANCE LETTER (OUTPATIENT)
Age: 34
End: 2024-07-27

## 2024-09-09 NOTE — PROGRESS NOTES
She is feeling better from her allergies.  She is feeling better.  She is not using any loratadine that I sent in.    She will try fluticasone.    US evaluated.  She knows the gender.  Will set up repeat US to evaluate outflow tract.    1 hour glucose done today.    uai done today    Influenza vaccine done today.      
She plans to go to [place] tomorrow.  I did offer a prescription for compression stockings for prevention of blood clots but she declined this today.  I did show her how to move in order to help prevent blood clots.  
48y Female presents today for presurgical testing for ROBOTIC SLEEVE GASTRECTOMY POSSIBLE LAPAROSCOPIC SLEEVE GASTRECTOMY POSSIBLE OPEN. Per Sx note dated 4/10/24 "47 year year old female who has been struggling with Her weight for many years. Ms. BUDDY CALVILLO has tried countless diet and exercise programs, but has been unable to lose sufficient weight and keep it off. She is here today to discuss surgical options for weight loss.  ?  Their medical history includes HTN and controlled seizures.  They have mild reflux GERD. They have not had a recent egd.  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 She has not had any imaging since then and does follow with an oncologist. Shes also had a hysterectomy and a cholecystectomy.  ?  They are able to easily walk up two flights of stairs without SOB and their STOPBANG score is low at 3.  ?  They do not smoke. 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 Their support system includes their family."  Patient states above is true and accurate. She denies pain and offers no other complaints at this time. Now for scheduled procedure.     Patient/guardian denies any CP, palpitations, SOB, or cough. + dysuria. No recent URI or UTI.  Stated exercise tolerance is FOS 1  ALDO screen reviewed    Patient/guardian denies any recent personal exposure to COVID19. Denies any sick contacts. Patient/guardian denies travel within the past 30 days. Patient was instructed to quarantine until after procedure.    Anesthesia Alert  YES--Difficult Airway - Class IV  NO--History of neck surgery or radiation  NO--Limited ROM of neck  NO--History of Malignant hyperthermia  NO--Personal or family history of Pseudocholinesterase deficiency.  NO--Prior Anesthesia Complication  NO--Latex Allergy  NO--Loose teeth  NO--History of Rheumatoid Arthritis  NO--ALDO  NO--Bleeding risk  YES--Other - LAST SEIZURE EARLY 2024, NEURO CLEARANCE NOTE ON TRES PACKET    [place] Activity Status Index (DASI) from [employer]  on 9/9/2024  ** All calculations should be rechecked by clinician prior to use **    RESULT SUMMARY:  58.2 points  The higher the score (maximum 58.2), the higher the functional status.    9.89 METs        INPUTS:  Take care of self —> 2.75 = Yes  Walk indoors —> 1.75 = Yes  Walk 1&ndash;2 blocks on level ground —> 2.75 = Yes  Climb a flight of stairs or walk up a hill —> 5.5 = Yes  Run a short distance —> 8 = Yes  Do light work around the house —> 2.7 = Yes  Do moderate work around the house —> 3.5 = Yes  Do heavy work around the house —> 8 = Yes  Do yardwork —> 4.5 = Yes  Have sexual relations —> 5.25 = Yes  Participate in moderate recreational activities —> 6 = Yes  Participate in strenuous sports —> 7.5 = Yes    Revised Cardiac Risk Index for Pre-Operative Risk from [employer]  on 9/9/2024  ** All calculations should be rechecked by clinician prior to use **    RESULT SUMMARY:  1 points  Class II Risk    6.0 %  30-day risk of death, MI, or cardiac arrest    From [employer] 2017. These numbers are higher than those from the original study (Gerson 1999). See Evidence for details.      INPUTS:  Elevated-risk surgery —> 1 = Yes  History of ischemic heart disease —> 0 = No  History of congestive heart failure —> 0 = No  History of cerebrovascular disease —> 0 = No  Pre-operative treatment with insulin —> 0 = No  Pre-operative creatinine >2 mg/dL / 176.8 µmol/L —> 0 = No    Patient/guardian states that this is their complete medical history and list of medications.  Patient/guardian understands instructions given during this visit and was given the opportunity to ask questions and have them answered. They were instructed to follow up with their surgeon/surgeon's office with any questions regarding their procedure.

## 2025-01-27 ENCOUNTER — HOSPITAL ENCOUNTER (OUTPATIENT)
Dept: MRI IMAGING | Facility: HOSPITAL | Age: 35
Discharge: HOME | End: 2025-01-27
Attending: NURSE PRACTITIONER | Admitting: NURSE PRACTITIONER
Payer: COMMERCIAL

## 2025-01-27 DIAGNOSIS — C56.1: ICD-10-CM

## 2025-01-27 PROCEDURE — 72197 MRI PELVIS W/O & W/DYE: CPT

## 2025-01-27 PROCEDURE — 255N000002 HC RX 255 OP 636: Performed by: NURSE PRACTITIONER

## 2025-01-27 PROCEDURE — A9585 GADOBUTROL INJECTION: HCPCS | Performed by: NURSE PRACTITIONER

## 2025-01-27 RX ORDER — GADOBUTROL 604.72 MG/ML
12 INJECTION INTRAVENOUS ONCE
Status: COMPLETED | OUTPATIENT
Start: 2025-01-27 | End: 2025-01-27

## 2025-01-27 RX ADMIN — GADOBUTROL 12 ML: 604.72 INJECTION INTRAVENOUS at 19:14

## 2025-05-20 ENCOUNTER — LAB REQUISITION (OUTPATIENT)
Dept: LAB | Facility: CLINIC | Age: 35
End: 2025-05-20

## 2025-05-20 DIAGNOSIS — L83 ACANTHOSIS NIGRICANS: ICD-10-CM

## 2025-05-20 PROCEDURE — 82310 ASSAY OF CALCIUM: CPT | Performed by: FAMILY MEDICINE

## 2025-05-21 LAB
ALBUMIN SERPL BCG-MCNC: 4.3 G/DL (ref 3.5–5.2)
ALP SERPL-CCNC: 71 U/L (ref 40–150)
ALT SERPL W P-5'-P-CCNC: 32 U/L (ref 0–50)
ANION GAP SERPL CALCULATED.3IONS-SCNC: 15 MMOL/L (ref 7–15)
AST SERPL W P-5'-P-CCNC: 23 U/L (ref 0–45)
BILIRUB SERPL-MCNC: 0.6 MG/DL
BUN SERPL-MCNC: 12.1 MG/DL (ref 6–20)
CALCIUM SERPL-MCNC: 9.3 MG/DL (ref 8.8–10.4)
CHLORIDE SERPL-SCNC: 102 MMOL/L (ref 98–107)
CREAT SERPL-MCNC: 0.69 MG/DL (ref 0.51–0.95)
EGFRCR SERPLBLD CKD-EPI 2021: >90 ML/MIN/1.73M2
GLUCOSE SERPL-MCNC: 140 MG/DL (ref 70–99)
HCO3 SERPL-SCNC: 20 MMOL/L (ref 22–29)
POTASSIUM SERPL-SCNC: 3.6 MMOL/L (ref 3.4–5.3)
PROT SERPL-MCNC: 7.7 G/DL (ref 6.4–8.3)
SODIUM SERPL-SCNC: 137 MMOL/L (ref 135–145)

## 2025-07-11 ENCOUNTER — TRANSFERRED RECORDS (OUTPATIENT)
Dept: HEALTH INFORMATION MANAGEMENT | Facility: CLINIC | Age: 35
End: 2025-07-11
Payer: COMMERCIAL

## 2025-08-10 ENCOUNTER — HEALTH MAINTENANCE LETTER (OUTPATIENT)
Age: 35
End: 2025-08-10

## 2025-08-26 ENCOUNTER — LAB REQUISITION (OUTPATIENT)
Dept: LAB | Facility: CLINIC | Age: 35
End: 2025-08-26

## 2025-08-26 DIAGNOSIS — Z32.01 ENCOUNTER FOR PREGNANCY TEST, RESULT POSITIVE: ICD-10-CM

## 2025-08-26 LAB
ABO + RH BLD: NORMAL
BASOPHILS # BLD AUTO: 0.05 10E3/UL (ref 0–0.2)
BASOPHILS NFR BLD AUTO: 0.6 %
BLD GP AB SCN SERPL QL: NEGATIVE
EOSINOPHIL # BLD AUTO: 0.17 10E3/UL (ref 0–0.7)
EOSINOPHIL NFR BLD AUTO: 1.9 %
ERYTHROCYTE [DISTWIDTH] IN BLOOD BY AUTOMATED COUNT: 13.2 % (ref 10–15)
HCT VFR BLD AUTO: 39.8 % (ref 35–47)
HGB BLD-MCNC: 13.2 G/DL (ref 11.7–15.7)
IMM GRANULOCYTES # BLD: 0.03 10E3/UL
IMM GRANULOCYTES NFR BLD: 0.3 %
LYMPHOCYTES # BLD AUTO: 2.71 10E3/UL (ref 0.8–5.3)
LYMPHOCYTES NFR BLD AUTO: 30.6 %
MCH RBC QN AUTO: 29.9 PG (ref 26.5–33)
MCHC RBC AUTO-ENTMCNC: 33.2 G/DL (ref 31.5–36.5)
MCV RBC AUTO: 90 FL (ref 78–100)
MONOCYTES # BLD AUTO: 0.44 10E3/UL (ref 0–1.3)
MONOCYTES NFR BLD AUTO: 5 %
NEUTROPHILS # BLD AUTO: 5.47 10E3/UL (ref 1.6–8.3)
NEUTROPHILS NFR BLD AUTO: 61.6 %
NRBC # BLD AUTO: <0.03 10E3/UL
NRBC BLD AUTO-RTO: 0 /100
PLATELET # BLD AUTO: 263 10E3/UL (ref 150–450)
RBC # BLD AUTO: 4.42 10E6/UL (ref 3.8–5.2)
SPECIMEN EXP DATE BLD: NORMAL
WBC # BLD AUTO: 8.87 10E3/UL (ref 4–11)

## 2025-08-26 PROCEDURE — 87340 HEPATITIS B SURFACE AG IA: CPT | Performed by: FAMILY MEDICINE

## 2025-08-26 PROCEDURE — 87389 HIV-1 AG W/HIV-1&-2 AB AG IA: CPT | Performed by: FAMILY MEDICINE

## 2025-08-26 PROCEDURE — 85004 AUTOMATED DIFF WBC COUNT: CPT | Performed by: FAMILY MEDICINE

## 2025-08-26 PROCEDURE — 87088 URINE BACTERIA CULTURE: CPT | Performed by: FAMILY MEDICINE

## 2025-08-26 PROCEDURE — 86780 TREPONEMA PALLIDUM: CPT | Performed by: FAMILY MEDICINE

## 2025-08-26 PROCEDURE — 87491 CHLMYD TRACH DNA AMP PROBE: CPT | Performed by: FAMILY MEDICINE

## 2025-08-26 PROCEDURE — 86762 RUBELLA ANTIBODY: CPT | Performed by: FAMILY MEDICINE

## 2025-08-26 PROCEDURE — 86803 HEPATITIS C AB TEST: CPT | Performed by: FAMILY MEDICINE

## 2025-08-26 PROCEDURE — 84156 ASSAY OF PROTEIN URINE: CPT | Performed by: FAMILY MEDICINE

## 2025-08-26 PROCEDURE — 84155 ASSAY OF PROTEIN SERUM: CPT | Performed by: FAMILY MEDICINE

## 2025-08-26 PROCEDURE — 86900 BLOOD TYPING SEROLOGIC ABO: CPT | Performed by: FAMILY MEDICINE

## 2025-08-27 LAB
ALBUMIN MFR UR ELPH: <6 MG/DL
ALBUMIN SERPL BCG-MCNC: 4.5 G/DL (ref 3.5–5.2)
ALP SERPL-CCNC: 62 U/L (ref 40–150)
ALT SERPL W P-5'-P-CCNC: 37 U/L (ref 0–50)
ANION GAP SERPL CALCULATED.3IONS-SCNC: 23 MMOL/L (ref 7–15)
AST SERPL W P-5'-P-CCNC: 28 U/L (ref 0–45)
BILIRUB SERPL-MCNC: 1.1 MG/DL
BUN SERPL-MCNC: 10 MG/DL (ref 6–20)
C TRACH DNA SPEC QL PROBE+SIG AMP: NEGATIVE
CALCIUM SERPL-MCNC: 9.4 MG/DL (ref 8.8–10.4)
CHLORIDE SERPL-SCNC: 98 MMOL/L (ref 98–107)
CREAT SERPL-MCNC: 0.6 MG/DL (ref 0.51–0.95)
CREAT UR-MCNC: 64 MG/DL
EGFRCR SERPLBLD CKD-EPI 2021: >90 ML/MIN/1.73M2
GLUCOSE SERPL-MCNC: 114 MG/DL (ref 70–99)
HBV SURFACE AG SERPL QL IA: NONREACTIVE
HCO3 SERPL-SCNC: 18 MMOL/L (ref 22–29)
HCV AB SERPL QL IA: NONREACTIVE
HIV 1+2 AB+HIV1 P24 AG SERPL QL IA: NONREACTIVE
N GONORRHOEA DNA SPEC QL NAA+PROBE: NEGATIVE
POTASSIUM SERPL-SCNC: 3.6 MMOL/L (ref 3.4–5.3)
PROT SERPL-MCNC: 7.5 G/DL (ref 6.4–8.3)
PROT/CREAT 24H UR: NORMAL MG/G{CREAT}
RUBV IGG SERPL QL IA: 1.21 INDEX
RUBV IGG SERPL QL IA: POSITIVE
SODIUM SERPL-SCNC: 139 MMOL/L (ref 135–145)
SPECIMEN TYPE: NORMAL
T PALLIDUM AB SER QL: NONREACTIVE

## 2025-08-28 LAB
BACTERIA UR CULT: ABNORMAL
BACTERIA UR CULT: ABNORMAL